# Patient Record
Sex: MALE | Race: WHITE | Employment: UNEMPLOYED | URBAN - METROPOLITAN AREA
[De-identification: names, ages, dates, MRNs, and addresses within clinical notes are randomized per-mention and may not be internally consistent; named-entity substitution may affect disease eponyms.]

---

## 2020-01-01 ENCOUNTER — HOSPITAL ENCOUNTER (EMERGENCY)
Facility: CLINIC | Age: 25
Discharge: HOME | End: 2020-07-29
Attending: EMERGENCY MEDICINE
Payer: MEDICARE

## 2020-01-01 ENCOUNTER — APPOINTMENT (OUTPATIENT)
Dept: GENERAL RADIOLOGY | Age: 25
DRG: 917 | End: 2020-01-01
Payer: MEDICARE

## 2020-01-01 ENCOUNTER — CARE COORDINATION (OUTPATIENT)
Dept: CARE COORDINATION | Age: 25
End: 2020-01-01

## 2020-01-01 ENCOUNTER — HOSPITAL ENCOUNTER (INPATIENT)
Age: 25
LOS: 4 days | Discharge: HOME | DRG: 885 | End: 2020-07-10
Attending: PSYCHIATRY & NEUROLOGY | Admitting: PSYCHIATRY & NEUROLOGY
Payer: MEDICARE

## 2020-01-01 ENCOUNTER — HOSPITAL ENCOUNTER (INPATIENT)
Age: 25
LOS: 1 days | DRG: 917 | End: 2020-09-22
Attending: EMERGENCY MEDICINE | Admitting: INTERNAL MEDICINE
Payer: MEDICARE

## 2020-01-01 VITALS
TEMPERATURE: 101.3 F | RESPIRATION RATE: 34 BRPM | SYSTOLIC BLOOD PRESSURE: 117 MMHG | HEIGHT: 69 IN | DIASTOLIC BLOOD PRESSURE: 40 MMHG | OXYGEN SATURATION: 96 % | BODY MASS INDEX: 46.65 KG/M2 | WEIGHT: 315 LBS | HEART RATE: 52 BPM

## 2020-01-01 VITALS
WEIGHT: 285 LBS | BODY MASS INDEX: 43.19 KG/M2 | HEART RATE: 85 BPM | DIASTOLIC BLOOD PRESSURE: 80 MMHG | RESPIRATION RATE: 14 BRPM | SYSTOLIC BLOOD PRESSURE: 136 MMHG | TEMPERATURE: 97.3 F | OXYGEN SATURATION: 95 % | HEIGHT: 68 IN

## 2020-01-01 VITALS
BODY MASS INDEX: 46.65 KG/M2 | OXYGEN SATURATION: 95 % | RESPIRATION RATE: 17 BRPM | WEIGHT: 315 LBS | SYSTOLIC BLOOD PRESSURE: 137 MMHG | HEART RATE: 87 BPM | DIASTOLIC BLOOD PRESSURE: 81 MMHG | HEIGHT: 69 IN | TEMPERATURE: 98.4 F

## 2020-01-01 LAB
-: ABNORMAL
ABSOLUTE EOS #: 0.36 K/UL (ref 0–0.44)
ABSOLUTE EOS #: 0.4 K/UL (ref 0–0.4)
ABSOLUTE IMMATURE GRANULOCYTE: 0.13 K/UL (ref 0–0.3)
ABSOLUTE IMMATURE GRANULOCYTE: ABNORMAL K/UL (ref 0–0.3)
ABSOLUTE LYMPH #: 2.3 K/UL (ref 1–4.8)
ABSOLUTE LYMPH #: 4.3 K/UL (ref 1.1–3.7)
ABSOLUTE MONO #: 0.7 K/UL (ref 0.1–1.3)
ABSOLUTE MONO #: 1.12 K/UL (ref 0.1–1.2)
ACETAMINOPHEN LEVEL: <5 UG/ML (ref 10–30)
ALBUMIN SERPL-MCNC: 3.4 G/DL (ref 3.5–5.2)
ALBUMIN SERPL-MCNC: 4 G/DL (ref 3.5–5.2)
ALBUMIN/GLOBULIN RATIO: 1.3 (ref 1–2.5)
ALBUMIN/GLOBULIN RATIO: ABNORMAL (ref 1–2.5)
ALLEN TEST: ABNORMAL
ALLEN TEST: POSITIVE
ALP BLD-CCNC: 71 U/L (ref 40–129)
ALP BLD-CCNC: 89 U/L (ref 40–129)
ALT SERPL-CCNC: 18 U/L (ref 5–41)
ALT SERPL-CCNC: 18 U/L (ref 5–41)
AMORPHOUS: ABNORMAL
AMPHETAMINE SCREEN URINE: NEGATIVE
ANION GAP SERPL CALCULATED.3IONS-SCNC: 11 MMOL/L (ref 9–17)
ANION GAP SERPL CALCULATED.3IONS-SCNC: 13 MMOL/L (ref 9–17)
ANION GAP SERPL CALCULATED.3IONS-SCNC: 22 MMOL/L (ref 9–17)
ANION GAP SERPL CALCULATED.3IONS-SCNC: 22 MMOL/L (ref 9–17)
ANION GAP: 12 MMOL/L (ref 7–16)
ANION GAP: 19 MMOL/L (ref 7–16)
AST SERPL-CCNC: 15 U/L
AST SERPL-CCNC: 21 U/L
BACTERIA: ABNORMAL
BARBITURATE SCREEN URINE: NEGATIVE
BASOPHILS # BLD: 1 % (ref 0–2)
BASOPHILS # BLD: 1 % (ref 0–2)
BASOPHILS ABSOLUTE: 0.09 K/UL (ref 0–0.2)
BASOPHILS ABSOLUTE: 0.1 K/UL (ref 0–0.2)
BENZODIAZEPINE SCREEN, URINE: NEGATIVE
BILIRUB SERPL-MCNC: 0.25 MG/DL (ref 0.3–1.2)
BILIRUB SERPL-MCNC: 0.38 MG/DL (ref 0.3–1.2)
BILIRUBIN URINE: NEGATIVE
BUN BLDV-MCNC: 10 MG/DL (ref 6–20)
BUN BLDV-MCNC: 11 MG/DL (ref 6–20)
BUN BLDV-MCNC: 16 MG/DL (ref 6–20)
BUN BLDV-MCNC: 18 MG/DL (ref 6–20)
BUN/CREAT BLD: ABNORMAL (ref 9–20)
BUPRENORPHINE URINE: NORMAL
CALCIUM IONIZED: 1.25 MMOL/L (ref 1.13–1.33)
CALCIUM IONIZED: 1.35 MMOL/L (ref 1.13–1.33)
CALCIUM IONIZED: 1.46 MMOL/L (ref 1.13–1.33)
CALCIUM SERPL-MCNC: 10.9 MG/DL (ref 8.6–10.4)
CALCIUM SERPL-MCNC: 9.1 MG/DL (ref 8.6–10.4)
CALCIUM SERPL-MCNC: 9.5 MG/DL (ref 8.6–10.4)
CALCIUM SERPL-MCNC: 9.6 MG/DL (ref 8.6–10.4)
CANNABINOID SCREEN URINE: NEGATIVE
CASTS UA: ABNORMAL /LPF (ref 0–8)
CHLORIDE BLD-SCNC: 105 MMOL/L (ref 98–107)
CHLORIDE BLD-SCNC: 108 MMOL/L (ref 98–107)
CHLORIDE BLD-SCNC: 99 MMOL/L (ref 98–107)
CHLORIDE BLD-SCNC: 99 MMOL/L (ref 98–107)
CHOLESTEROL/HDL RATIO: 5.1
CHOLESTEROL: 168 MG/DL
CO2: 14 MMOL/L (ref 20–31)
CO2: 15 MMOL/L (ref 20–31)
CO2: 20 MMOL/L (ref 20–31)
CO2: 23 MMOL/L (ref 20–31)
COCAINE METABOLITE, URINE: NEGATIVE
COLOR: YELLOW
CREAT SERPL-MCNC: 0.84 MG/DL (ref 0.7–1.2)
CREAT SERPL-MCNC: 0.86 MG/DL (ref 0.7–1.2)
CREAT SERPL-MCNC: 2.08 MG/DL (ref 0.7–1.2)
CREAT SERPL-MCNC: 2.67 MG/DL (ref 0.7–1.2)
CRYSTALS, UA: ABNORMAL /HPF
DIFFERENTIAL TYPE: ABNORMAL
DIFFERENTIAL TYPE: ABNORMAL
EKG ATRIAL RATE: 37 BPM
EKG ATRIAL RATE: 39 BPM
EKG Q-T INTERVAL: 498 MS
EKG Q-T INTERVAL: 532 MS
EKG QRS DURATION: 162 MS
EKG QRS DURATION: 174 MS
EKG QTC CALCULATION (BAZETT): 476 MS
EKG QTC CALCULATION (BAZETT): 485 MS
EKG R AXIS: -22 DEGREES
EKG R AXIS: -36 DEGREES
EKG T AXIS: 109 DEGREES
EKG T AXIS: 127 DEGREES
EKG VENTRICULAR RATE: 50 BPM
EKG VENTRICULAR RATE: 55 BPM
EOSINOPHILS RELATIVE PERCENT: 2 % (ref 1–4)
EOSINOPHILS RELATIVE PERCENT: 4 % (ref 0–4)
EPITHELIAL CELLS UA: ABNORMAL /HPF (ref 0–5)
ESTIMATED AVERAGE GLUCOSE: 114 MG/DL
ETHANOL PERCENT: <0.01 %
ETHANOL: <10 MG/DL
FIO2: ABNORMAL
FIO2: ABNORMAL
GFR AFRICAN AMERICAN: 36 ML/MIN
GFR AFRICAN AMERICAN: 47 ML/MIN
GFR AFRICAN AMERICAN: >60 ML/MIN
GFR AFRICAN AMERICAN: >60 ML/MIN
GFR NON-AFRICAN AMERICAN: 29 ML/MIN
GFR NON-AFRICAN AMERICAN: 39 ML/MIN
GFR NON-AFRICAN AMERICAN: 48 ML/MIN
GFR NON-AFRICAN AMERICAN: >60 ML/MIN
GFR SERPL CREATININE-BSD FRML MDRD: 58 ML/MIN
GFR SERPL CREATININE-BSD FRML MDRD: >60 ML/MIN
GFR SERPL CREATININE-BSD FRML MDRD: ABNORMAL ML/MIN/{1.73_M2}
GFR SERPL CREATININE-BSD FRML MDRD: NORMAL ML/MIN/{1.73_M2}
GLUCOSE BLD-MCNC: 107 MG/DL (ref 70–99)
GLUCOSE BLD-MCNC: 127 MG/DL (ref 70–99)
GLUCOSE BLD-MCNC: 131 MG/DL (ref 74–100)
GLUCOSE BLD-MCNC: 273 MG/DL (ref 75–110)
GLUCOSE BLD-MCNC: 317 MG/DL (ref 75–110)
GLUCOSE BLD-MCNC: 363 MG/DL (ref 75–110)
GLUCOSE BLD-MCNC: 386 MG/DL (ref 75–110)
GLUCOSE BLD-MCNC: 485 MG/DL (ref 74–100)
GLUCOSE BLD-MCNC: 528 MG/DL (ref 75–110)
GLUCOSE BLD-MCNC: 531 MG/DL (ref 74–100)
GLUCOSE BLD-MCNC: 582 MG/DL (ref 75–110)
GLUCOSE BLD-MCNC: 599 MG/DL (ref 75–110)
GLUCOSE BLD-MCNC: 617 MG/DL (ref 70–99)
GLUCOSE BLD-MCNC: 661 MG/DL (ref 70–99)
GLUCOSE BLD-MCNC: 680 MG/DL (ref 70–99)
GLUCOSE BLD-MCNC: 687 MG/DL (ref 70–99)
GLUCOSE URINE: NEGATIVE
HBA1C MFR BLD: 5.6 % (ref 4–6)
HCO3 VENOUS: 23.3 MMOL/L (ref 22–29)
HCT VFR BLD CALC: 39.9 % (ref 40.7–50.3)
HCT VFR BLD CALC: 45.6 % (ref 41–53)
HDLC SERPL-MCNC: 33 MG/DL
HEMOGLOBIN: 13.2 G/DL (ref 13–17)
HEMOGLOBIN: 15.3 G/DL (ref 13.5–17.5)
IMMATURE GRANULOCYTES: 1 %
IMMATURE GRANULOCYTES: ABNORMAL %
KETONES, URINE: NEGATIVE
LACTIC ACID, WHOLE BLOOD: 10.5 MMOL/L (ref 0.7–2.1)
LACTIC ACID, WHOLE BLOOD: 11.3 MMOL/L (ref 0.7–2.1)
LACTIC ACID, WHOLE BLOOD: 8.5 MMOL/L (ref 0.7–2.1)
LDL CHOLESTEROL: 98 MG/DL (ref 0–130)
LEUKOCYTE ESTERASE, URINE: NEGATIVE
LV EF: 63 %
LVEF MODALITY: NORMAL
LYMPHOCYTES # BLD: 25 % (ref 24–43)
LYMPHOCYTES # BLD: 25 % (ref 24–44)
MAGNESIUM: 1.8 MG/DL (ref 1.6–2.6)
MAGNESIUM: 2 MG/DL (ref 1.6–2.6)
MAGNESIUM: 2.5 MG/DL (ref 1.6–2.6)
MCH RBC QN AUTO: 28.2 PG (ref 26–34)
MCH RBC QN AUTO: 28.4 PG (ref 25.2–33.5)
MCHC RBC AUTO-ENTMCNC: 33.1 G/DL (ref 28.4–34.8)
MCHC RBC AUTO-ENTMCNC: 33.6 G/DL (ref 31–37)
MCV RBC AUTO: 83.9 FL (ref 80–100)
MCV RBC AUTO: 85.8 FL (ref 82.6–102.9)
MDMA URINE: NORMAL
METHADONE SCREEN, URINE: NEGATIVE
METHAMPHETAMINE, URINE: NORMAL
MODE: ABNORMAL
MODE: ABNORMAL
MONOCYTES # BLD: 7 % (ref 3–12)
MONOCYTES # BLD: 8 % (ref 1–7)
MUCUS: ABNORMAL
MYOGLOBIN: 42 NG/ML (ref 28–72)
NEGATIVE BASE EXCESS, ART: 9 (ref 0–2)
NEGATIVE BASE EXCESS, VEN: 2 (ref 0–2)
NITRITE, URINE: NEGATIVE
NRBC AUTOMATED: 0 PER 100 WBC
NRBC AUTOMATED: ABNORMAL PER 100 WBC
O2 DEVICE/FLOW/%: ABNORMAL
O2 DEVICE/FLOW/%: ABNORMAL
O2 SAT, VEN: 86 % (ref 60–85)
OPIATES, URINE: NEGATIVE
OTHER OBSERVATIONS UA: ABNORMAL
OXYCODONE SCREEN URINE: NEGATIVE
PATIENT TEMP: ABNORMAL
PATIENT TEMP: ABNORMAL
PCO2, VEN: 43.3 MM HG (ref 41–51)
PDW BLD-RTO: 14.3 % (ref 11.8–14.4)
PDW BLD-RTO: 14.5 % (ref 11.5–14.9)
PH UA: 6.5 (ref 5–8)
PH VENOUS: 7.34 (ref 7.32–7.43)
PHENCYCLIDINE, URINE: NEGATIVE
PHOSPHORUS: 1.9 MG/DL (ref 2.5–4.5)
PLATELET # BLD: 259 K/UL (ref 150–450)
PLATELET # BLD: 343 K/UL (ref 138–453)
PLATELET ESTIMATE: ABNORMAL
PLATELET ESTIMATE: ABNORMAL
PMV BLD AUTO: 10.8 FL (ref 8.1–13.5)
PMV BLD AUTO: 8.6 FL (ref 6–12)
PO2, VEN: 55.5 MM HG (ref 30–50)
POC CHLORIDE: 105 MMOL/L (ref 98–107)
POC CHLORIDE: 105 MMOL/L (ref 98–107)
POC CREATININE: 1.06 MG/DL (ref 0.51–1.19)
POC CREATININE: 1.75 MG/DL (ref 0.51–1.19)
POC HCO3: 16.5 MMOL/L (ref 21–28)
POC HEMATOCRIT: 37 % (ref 41–53)
POC HEMATOCRIT: 39 % (ref 41–53)
POC HEMOGLOBIN: 12.6 G/DL (ref 13.5–17.5)
POC HEMOGLOBIN: 13.1 G/DL (ref 13.5–17.5)
POC IONIZED CALCIUM: 1.31 MMOL/L (ref 1.15–1.33)
POC IONIZED CALCIUM: 1.4 MMOL/L (ref 1.15–1.33)
POC LACTIC ACID: 4.29 MMOL/L (ref 0.56–1.39)
POC LACTIC ACID: 8.6 MMOL/L (ref 0.56–1.39)
POC LACTIC ACID: 8.75 MMOL/L (ref 0.56–1.39)
POC O2 SATURATION: 94 % (ref 94–98)
POC PCO2 TEMP: ABNORMAL MM HG
POC PCO2 TEMP: ABNORMAL MM HG
POC PCO2: 34.2 MM HG (ref 35–48)
POC PH TEMP: ABNORMAL
POC PH TEMP: ABNORMAL
POC PH: 7.29 (ref 7.35–7.45)
POC PO2 TEMP: ABNORMAL MM HG
POC PO2 TEMP: ABNORMAL MM HG
POC PO2: 75.6 MM HG (ref 83–108)
POC POTASSIUM: 1.7 MMOL/L (ref 3.5–4.5)
POC POTASSIUM: 2.4 MMOL/L (ref 3.5–4.5)
POC POTASSIUM: 4.1 MMOL/L (ref 3.5–4.5)
POC SODIUM: 140 MMOL/L (ref 138–146)
POC SODIUM: 140 MMOL/L (ref 138–146)
POSITIVE BASE EXCESS, ART: ABNORMAL (ref 0–3)
POSITIVE BASE EXCESS, VEN: ABNORMAL (ref 0–3)
POTASSIUM SERPL-SCNC: 1.9 MMOL/L (ref 3.7–5.3)
POTASSIUM SERPL-SCNC: 2.6 MMOL/L (ref 3.7–5.3)
POTASSIUM SERPL-SCNC: 2.9 MMOL/L (ref 3.7–5.3)
POTASSIUM SERPL-SCNC: 3.1 MMOL/L (ref 3.7–5.3)
POTASSIUM SERPL-SCNC: 3.4 MMOL/L (ref 3.7–5.3)
POTASSIUM SERPL-SCNC: 3.8 MMOL/L (ref 3.7–5.3)
POTASSIUM SERPL-SCNC: 4 MMOL/L (ref 3.7–5.3)
PROPOXYPHENE, URINE: NORMAL
PROTEIN UA: ABNORMAL
RBC # BLD: 4.65 M/UL (ref 4.21–5.77)
RBC # BLD: 5.44 M/UL (ref 4.5–5.9)
RBC # BLD: ABNORMAL 10*6/UL
RBC # BLD: ABNORMAL 10*6/UL
RBC UA: ABNORMAL /HPF (ref 0–4)
RENAL EPITHELIAL, UA: ABNORMAL /HPF
SALICYLATE LEVEL: <1 MG/DL (ref 3–10)
SAMPLE SITE: ABNORMAL
SAMPLE SITE: ABNORMAL
SARS-COV-2, PCR: NORMAL
SARS-COV-2, RAPID: NORMAL
SARS-COV-2, RAPID: NOT DETECTED
SARS-COV-2: NORMAL
SARS-COV-2: NORMAL
SARS-COV-2: NOT DETECTED
SEG NEUTROPHILS: 62 % (ref 36–66)
SEG NEUTROPHILS: 64 % (ref 36–65)
SEGMENTED NEUTROPHILS ABSOLUTE COUNT: 11.14 K/UL (ref 1.5–8.1)
SEGMENTED NEUTROPHILS ABSOLUTE COUNT: 5.8 K/UL (ref 1.3–9.1)
SODIUM BLD-SCNC: 135 MMOL/L (ref 135–144)
SODIUM BLD-SCNC: 136 MMOL/L (ref 135–144)
SODIUM BLD-SCNC: 139 MMOL/L (ref 135–144)
SODIUM BLD-SCNC: 141 MMOL/L (ref 135–144)
SOURCE: NORMAL
SOURCE: NORMAL
SPECIFIC GRAVITY UA: 1.01 (ref 1–1.03)
TCO2 (CALC), ART: 18 MMOL/L (ref 22–29)
TEST INFORMATION: NORMAL
THYROXINE, FREE: 1 NG/DL (ref 0.93–1.7)
TOTAL CK: 100 U/L (ref 39–308)
TOTAL CO2, VENOUS: 25 MMOL/L (ref 23–30)
TOTAL PROTEIN: 6.1 G/DL (ref 6.4–8.3)
TOTAL PROTEIN: 7.4 G/DL (ref 6.4–8.3)
TOXIC TRICYCLIC SC,BLOOD: NEGATIVE
TRICHOMONAS: ABNORMAL
TRICYCLIC ANTIDEPRESSANTS, UR: NORMAL
TRIGL SERPL-MCNC: 186 MG/DL
TSH SERPL DL<=0.05 MIU/L-ACNC: 2.14 MIU/L (ref 0.3–5)
TURBIDITY: CLEAR
URINE HGB: NEGATIVE
UROBILINOGEN, URINE: NORMAL
VLDLC SERPL CALC-MCNC: ABNORMAL MG/DL (ref 1–30)
WBC # BLD: 17.1 K/UL (ref 3.5–11.3)
WBC # BLD: 9.2 K/UL (ref 3.5–11)
WBC # BLD: ABNORMAL 10*3/UL
WBC # BLD: ABNORMAL 10*3/UL
WBC UA: ABNORMAL /HPF (ref 0–5)
YEAST: ABNORMAL

## 2020-01-01 PROCEDURE — 84443 ASSAY THYROID STIM HORMONE: CPT

## 2020-01-01 PROCEDURE — 80053 COMPREHEN METABOLIC PANEL: CPT

## 2020-01-01 PROCEDURE — 1240000000 HC EMOTIONAL WELLNESS R&B

## 2020-01-01 PROCEDURE — 31500 INSERT EMERGENCY AIRWAY: CPT

## 2020-01-01 PROCEDURE — 6360000002 HC RX W HCPCS

## 2020-01-01 PROCEDURE — 85025 COMPLETE CBC W/AUTO DIFF WBC: CPT

## 2020-01-01 PROCEDURE — 84100 ASSAY OF PHOSPHORUS: CPT

## 2020-01-01 PROCEDURE — 93005 ELECTROCARDIOGRAM TRACING: CPT | Performed by: EMERGENCY MEDICINE

## 2020-01-01 PROCEDURE — 82550 ASSAY OF CK (CPK): CPT

## 2020-01-01 PROCEDURE — 82435 ASSAY OF BLOOD CHLORIDE: CPT

## 2020-01-01 PROCEDURE — 80048 BASIC METABOLIC PNL TOTAL CA: CPT

## 2020-01-01 PROCEDURE — 2580000003 HC RX 258

## 2020-01-01 PROCEDURE — 2500000003 HC RX 250 WO HCPCS: Performed by: STUDENT IN AN ORGANIZED HEALTH CARE EDUCATION/TRAINING PROGRAM

## 2020-01-01 PROCEDURE — 81001 URINALYSIS AUTO W/SCOPE: CPT

## 2020-01-01 PROCEDURE — 6360000002 HC RX W HCPCS: Performed by: STUDENT IN AN ORGANIZED HEALTH CARE EDUCATION/TRAINING PROGRAM

## 2020-01-01 PROCEDURE — 2580000003 HC RX 258: Performed by: STUDENT IN AN ORGANIZED HEALTH CARE EDUCATION/TRAINING PROGRAM

## 2020-01-01 PROCEDURE — 6370000000 HC RX 637 (ALT 250 FOR IP): Performed by: NURSE PRACTITIONER

## 2020-01-01 PROCEDURE — 82947 ASSAY GLUCOSE BLOOD QUANT: CPT

## 2020-01-01 PROCEDURE — 5A1935Z RESPIRATORY VENTILATION, LESS THAN 24 CONSECUTIVE HOURS: ICD-10-PCS | Performed by: INTERNAL MEDICINE

## 2020-01-01 PROCEDURE — 99292 CRITICAL CARE ADDL 30 MIN: CPT

## 2020-01-01 PROCEDURE — 94770 HC ETCO2 MONITOR DAILY: CPT

## 2020-01-01 PROCEDURE — G0480 DRUG TEST DEF 1-7 CLASSES: HCPCS

## 2020-01-01 PROCEDURE — 71045 X-RAY EXAM CHEST 1 VIEW: CPT

## 2020-01-01 PROCEDURE — 80307 DRUG TEST PRSMV CHEM ANLYZR: CPT

## 2020-01-01 PROCEDURE — 6370000000 HC RX 637 (ALT 250 FOR IP): Performed by: STUDENT IN AN ORGANIZED HEALTH CARE EDUCATION/TRAINING PROGRAM

## 2020-01-01 PROCEDURE — 80061 LIPID PANEL: CPT

## 2020-01-01 PROCEDURE — 99283 EMERGENCY DEPT VISIT LOW MDM: CPT

## 2020-01-01 PROCEDURE — 90792 PSYCH DIAG EVAL W/MED SRVCS: CPT | Performed by: NURSE PRACTITIONER

## 2020-01-01 PROCEDURE — 93306 TTE W/DOPPLER COMPLETE: CPT

## 2020-01-01 PROCEDURE — 82565 ASSAY OF CREATININE: CPT

## 2020-01-01 PROCEDURE — 0BH18EZ INSERTION OF ENDOTRACHEAL AIRWAY INTO TRACHEA, VIA NATURAL OR ARTIFICIAL OPENING ENDOSCOPIC: ICD-10-PCS | Performed by: EMERGENCY MEDICINE

## 2020-01-01 PROCEDURE — U0003 INFECTIOUS AGENT DETECTION BY NUCLEIC ACID (DNA OR RNA); SEVERE ACUTE RESPIRATORY SYNDROME CORONAVIRUS 2 (SARS-COV-2) (CORONAVIRUS DISEASE [COVID-19]), AMPLIFIED PROBE TECHNIQUE, MAKING USE OF HIGH THROUGHPUT TECHNOLOGIES AS DESCRIBED BY CMS-2020-01-R: HCPCS

## 2020-01-01 PROCEDURE — 84295 ASSAY OF SERUM SODIUM: CPT

## 2020-01-01 PROCEDURE — 83874 ASSAY OF MYOGLOBIN: CPT

## 2020-01-01 PROCEDURE — 94002 VENT MGMT INPAT INIT DAY: CPT

## 2020-01-01 PROCEDURE — 2700000000 HC OXYGEN THERAPY PER DAY

## 2020-01-01 PROCEDURE — 82330 ASSAY OF CALCIUM: CPT

## 2020-01-01 PROCEDURE — 82803 BLOOD GASES ANY COMBINATION: CPT

## 2020-01-01 PROCEDURE — 93005 ELECTROCARDIOGRAM TRACING: CPT | Performed by: STUDENT IN AN ORGANIZED HEALTH CARE EDUCATION/TRAINING PROGRAM

## 2020-01-01 PROCEDURE — 99291 CRITICAL CARE FIRST HOUR: CPT

## 2020-01-01 PROCEDURE — 94761 N-INVAS EAR/PLS OXIMETRY MLT: CPT

## 2020-01-01 PROCEDURE — 96368 THER/DIAG CONCURRENT INF: CPT

## 2020-01-01 PROCEDURE — 2000000000 HC ICU R&B

## 2020-01-01 PROCEDURE — 36415 COLL VENOUS BLD VENIPUNCTURE: CPT

## 2020-01-01 PROCEDURE — 99291 CRITICAL CARE FIRST HOUR: CPT | Performed by: INTERNAL MEDICINE

## 2020-01-01 PROCEDURE — 6360000002 HC RX W HCPCS: Performed by: INTERNAL MEDICINE

## 2020-01-01 PROCEDURE — 36600 WITHDRAWAL OF ARTERIAL BLOOD: CPT

## 2020-01-01 PROCEDURE — 84439 ASSAY OF FREE THYROXINE: CPT

## 2020-01-01 PROCEDURE — 96365 THER/PROPH/DIAG IV INF INIT: CPT

## 2020-01-01 PROCEDURE — U0002 COVID-19 LAB TEST NON-CDC: HCPCS

## 2020-01-01 PROCEDURE — 84132 ASSAY OF SERUM POTASSIUM: CPT

## 2020-01-01 PROCEDURE — 83605 ASSAY OF LACTIC ACID: CPT

## 2020-01-01 PROCEDURE — 85014 HEMATOCRIT: CPT

## 2020-01-01 PROCEDURE — 83735 ASSAY OF MAGNESIUM: CPT

## 2020-01-01 PROCEDURE — 99232 SBSQ HOSP IP/OBS MODERATE 35: CPT | Performed by: NURSE PRACTITIONER

## 2020-01-01 PROCEDURE — 99232 SBSQ HOSP IP/OBS MODERATE 35: CPT | Performed by: PSYCHIATRY & NEUROLOGY

## 2020-01-01 PROCEDURE — 02HV33Z INSERTION OF INFUSION DEVICE INTO SUPERIOR VENA CAVA, PERCUTANEOUS APPROACH: ICD-10-PCS | Performed by: EMERGENCY MEDICINE

## 2020-01-01 PROCEDURE — 83036 HEMOGLOBIN GLYCOSYLATED A1C: CPT

## 2020-01-01 PROCEDURE — 36556 INSERT NON-TUNNEL CV CATH: CPT

## 2020-01-01 PROCEDURE — 99238 HOSP IP/OBS DSCHRG MGMT 30/<: CPT | Performed by: NURSE PRACTITIONER

## 2020-01-01 PROCEDURE — 2500000003 HC RX 250 WO HCPCS

## 2020-01-01 RX ORDER — FLUOXETINE HYDROCHLORIDE 40 MG/1
40 CAPSULE ORAL DAILY
COMMUNITY

## 2020-01-01 RX ORDER — ATROPINE SULFATE 0.1 MG/ML
INJECTION INTRAVENOUS
Status: COMPLETED
Start: 2020-01-01 | End: 2020-01-01

## 2020-01-01 RX ORDER — SODIUM CHLORIDE 9 MG/ML
INJECTION, SOLUTION INTRAVENOUS CONTINUOUS
Status: DISCONTINUED | OUTPATIENT
Start: 2020-01-01 | End: 2020-01-01

## 2020-01-01 RX ORDER — SODIUM CHLORIDE 0.9 % (FLUSH) 0.9 %
10 SYRINGE (ML) INJECTION EVERY 12 HOURS SCHEDULED
Status: DISCONTINUED | OUTPATIENT
Start: 2020-01-01 | End: 2020-09-23 | Stop reason: HOSPADM

## 2020-01-01 RX ORDER — POLYETHYLENE GLYCOL 3350 17 G/17G
17 POWDER, FOR SOLUTION ORAL DAILY PRN
Status: DISCONTINUED | OUTPATIENT
Start: 2020-01-01 | End: 2020-09-23 | Stop reason: HOSPADM

## 2020-01-01 RX ORDER — NOREPINEPHRINE BIT/0.9 % NACL 16MG/250ML
10 INFUSION BOTTLE (ML) INTRAVENOUS CONTINUOUS
Status: DISCONTINUED | OUTPATIENT
Start: 2020-01-01 | End: 2020-01-01

## 2020-01-01 RX ORDER — MAGNESIUM SULFATE 1 G/100ML
1 INJECTION INTRAVENOUS
Status: COMPLETED | OUTPATIENT
Start: 2020-01-01 | End: 2020-01-01

## 2020-01-01 RX ORDER — TRAZODONE HYDROCHLORIDE 50 MG/1
50 TABLET ORAL NIGHTLY PRN
Status: DISCONTINUED | OUTPATIENT
Start: 2020-01-01 | End: 2020-01-01 | Stop reason: HOSPADM

## 2020-01-01 RX ORDER — ACETAMINOPHEN 650 MG/1
650 SUPPOSITORY RECTAL EVERY 6 HOURS PRN
Status: DISCONTINUED | OUTPATIENT
Start: 2020-01-01 | End: 2020-09-23 | Stop reason: HOSPADM

## 2020-01-01 RX ORDER — HYDROXYZINE HYDROCHLORIDE 25 MG/1
25 TABLET, FILM COATED ORAL 3 TIMES DAILY PRN
Status: DISCONTINUED | OUTPATIENT
Start: 2020-01-01 | End: 2020-01-01 | Stop reason: HOSPADM

## 2020-01-01 RX ORDER — CLONIDINE HYDROCHLORIDE 0.1 MG/1
0.1 TABLET ORAL NIGHTLY
Status: DISCONTINUED | OUTPATIENT
Start: 2020-01-01 | End: 2020-01-01 | Stop reason: HOSPADM

## 2020-01-01 RX ORDER — ARIPIPRAZOLE 5 MG/1
5 TABLET ORAL DAILY
Status: DISCONTINUED | OUTPATIENT
Start: 2020-01-01 | End: 2020-01-01 | Stop reason: HOSPADM

## 2020-01-01 RX ORDER — ONDANSETRON 2 MG/ML
INJECTION INTRAMUSCULAR; INTRAVENOUS
Status: DISPENSED
Start: 2020-01-01 | End: 2020-01-01

## 2020-01-01 RX ORDER — ZONISAMIDE 100 MG/1
200 CAPSULE ORAL 2 TIMES DAILY
Status: DISCONTINUED | OUTPATIENT
Start: 2020-01-01 | End: 2020-01-01 | Stop reason: HOSPADM

## 2020-01-01 RX ORDER — DOPAMINE HYDROCHLORIDE 160 MG/100ML
2.5 INJECTION, SOLUTION INTRAVENOUS CONTINUOUS
Status: DISCONTINUED | OUTPATIENT
Start: 2020-01-01 | End: 2020-09-23 | Stop reason: HOSPADM

## 2020-01-01 RX ORDER — NICOTINE 21 MG/24HR
1 PATCH, TRANSDERMAL 24 HOURS TRANSDERMAL DAILY
Status: DISCONTINUED | OUTPATIENT
Start: 2020-01-01 | End: 2020-01-01

## 2020-01-01 RX ORDER — DEXTROSE MONOHYDRATE 50 MG/ML
100 INJECTION, SOLUTION INTRAVENOUS PRN
Status: DISCONTINUED | OUTPATIENT
Start: 2020-01-01 | End: 2020-09-23 | Stop reason: HOSPADM

## 2020-01-01 RX ORDER — CALCIUM GLUCONATE 94 MG/ML
1 INJECTION, SOLUTION INTRAVENOUS ONCE
Status: DISCONTINUED | OUTPATIENT
Start: 2020-01-01 | End: 2020-09-23 | Stop reason: HOSPADM

## 2020-01-01 RX ORDER — ZONISAMIDE 100 MG/1
200 CAPSULE ORAL 2 TIMES DAILY
Status: DISCONTINUED | OUTPATIENT
Start: 2020-01-01 | End: 2020-09-23 | Stop reason: HOSPADM

## 2020-01-01 RX ORDER — CALCIUM CHLORIDE 100 MG/ML
INJECTION INTRAVENOUS; INTRAVENTRICULAR
Status: DISPENSED
Start: 2020-01-01 | End: 2020-01-01

## 2020-01-01 RX ORDER — ARIPIPRAZOLE 10 MG/1
10 TABLET ORAL DAILY
Status: DISCONTINUED | OUTPATIENT
Start: 2020-01-01 | End: 2020-09-23 | Stop reason: HOSPADM

## 2020-01-01 RX ORDER — ATROPINE SULFATE 0.1 MG/ML
0.6 INJECTION INTRAVENOUS ONCE
Status: COMPLETED | OUTPATIENT
Start: 2020-01-01 | End: 2020-01-01

## 2020-01-01 RX ORDER — 0.9 % SODIUM CHLORIDE 0.9 %
2000 INTRAVENOUS SOLUTION INTRAVENOUS ONCE
Status: COMPLETED | OUTPATIENT
Start: 2020-01-01 | End: 2020-01-01

## 2020-01-01 RX ORDER — ATROPINE SULFATE 0.1 MG/ML
INJECTION INTRAVENOUS
Status: DISPENSED
Start: 2020-01-01 | End: 2020-01-01

## 2020-01-01 RX ORDER — ACETAMINOPHEN 325 MG/1
650 TABLET ORAL EVERY 4 HOURS PRN
Status: DISCONTINUED | OUTPATIENT
Start: 2020-01-01 | End: 2020-01-01 | Stop reason: HOSPADM

## 2020-01-01 RX ORDER — POTASSIUM CHLORIDE 7.45 MG/ML
10 INJECTION INTRAVENOUS PRN
Status: DISCONTINUED | OUTPATIENT
Start: 2020-01-01 | End: 2020-09-23 | Stop reason: HOSPADM

## 2020-01-01 RX ORDER — CLONIDINE HYDROCHLORIDE 0.1 MG/1
0.1 TABLET ORAL NIGHTLY
Qty: 14 TABLET | Refills: 0 | Status: SHIPPED | OUTPATIENT
Start: 2020-01-01

## 2020-01-01 RX ORDER — DEXTROSE MONOHYDRATE 25 G/50ML
12.5 INJECTION, SOLUTION INTRAVENOUS PRN
Status: DISCONTINUED | OUTPATIENT
Start: 2020-01-01 | End: 2020-09-23 | Stop reason: HOSPADM

## 2020-01-01 RX ORDER — HYDROXYZINE HYDROCHLORIDE 25 MG/1
25 TABLET, FILM COATED ORAL 3 TIMES DAILY PRN
COMMUNITY

## 2020-01-01 RX ORDER — ARIPIPRAZOLE 5 MG/1
5 TABLET ORAL DAILY
COMMUNITY
End: 2020-01-01

## 2020-01-01 RX ORDER — NICOTINE POLACRILEX 4 MG
15 LOZENGE BUCCAL PRN
Status: DISCONTINUED | OUTPATIENT
Start: 2020-01-01 | End: 2020-09-23 | Stop reason: HOSPADM

## 2020-01-01 RX ORDER — TRAZODONE HYDROCHLORIDE 50 MG/1
50 TABLET ORAL NIGHTLY PRN
Qty: 14 TABLET | Refills: 0 | Status: SHIPPED | OUTPATIENT
Start: 2020-01-01

## 2020-01-01 RX ORDER — ACETAMINOPHEN 325 MG/1
650 TABLET ORAL EVERY 6 HOURS PRN
Status: DISCONTINUED | OUTPATIENT
Start: 2020-01-01 | End: 2020-09-23 | Stop reason: HOSPADM

## 2020-01-01 RX ORDER — CITALOPRAM 20 MG/1
20 TABLET ORAL DAILY
Status: ON HOLD | COMMUNITY
End: 2020-01-01 | Stop reason: HOSPADM

## 2020-01-01 RX ORDER — POTASSIUM CHLORIDE 29.8 MG/ML
20 INJECTION INTRAVENOUS ONCE
Status: DISCONTINUED | OUTPATIENT
Start: 2020-01-01 | End: 2020-09-23 | Stop reason: HOSPADM

## 2020-01-01 RX ORDER — ONDANSETRON 2 MG/ML
4 INJECTION INTRAMUSCULAR; INTRAVENOUS EVERY 6 HOURS PRN
Status: DISCONTINUED | OUTPATIENT
Start: 2020-01-01 | End: 2020-09-23 | Stop reason: HOSPADM

## 2020-01-01 RX ORDER — ATROPINE SULFATE 0.1 MG/ML
0.6 INJECTION INTRAVENOUS ONCE
Status: DISCONTINUED | OUTPATIENT
Start: 2020-01-01 | End: 2020-09-23 | Stop reason: HOSPADM

## 2020-01-01 RX ORDER — DEXTROSE MONOHYDRATE 100 MG/ML
INJECTION, SOLUTION INTRAVENOUS
Status: COMPLETED
Start: 2020-01-01 | End: 2020-01-01

## 2020-01-01 RX ORDER — NOREPINEPHRINE BIT/0.9 % NACL 16MG/250ML
2 INFUSION BOTTLE (ML) INTRAVENOUS CONTINUOUS
Status: DISCONTINUED | OUTPATIENT
Start: 2020-01-01 | End: 2020-09-23 | Stop reason: HOSPADM

## 2020-01-01 RX ORDER — SENNOSIDES 8.8 MG/5ML
5 LIQUID ORAL 2 TIMES DAILY PRN
Status: DISCONTINUED | OUTPATIENT
Start: 2020-01-01 | End: 2020-09-23 | Stop reason: HOSPADM

## 2020-01-01 RX ORDER — POTASSIUM CHLORIDE 29.8 MG/ML
20 INJECTION INTRAVENOUS PRN
Status: DISCONTINUED | OUTPATIENT
Start: 2020-01-01 | End: 2020-09-23 | Stop reason: HOSPADM

## 2020-01-01 RX ORDER — ARIPIPRAZOLE 10 MG/1
10 TABLET ORAL DAILY
COMMUNITY
Start: 2020-01-01 | End: 2020-01-01

## 2020-01-01 RX ORDER — SODIUM CHLORIDE 0.9 % (FLUSH) 0.9 %
10 SYRINGE (ML) INJECTION PRN
Status: DISCONTINUED | OUTPATIENT
Start: 2020-01-01 | End: 2020-09-23 | Stop reason: HOSPADM

## 2020-01-01 RX ORDER — DEXTROSE, SODIUM CHLORIDE, AND POTASSIUM CHLORIDE 5; .45; .15 G/100ML; G/100ML; G/100ML
INJECTION INTRAVENOUS CONTINUOUS PRN
Status: DISCONTINUED | OUTPATIENT
Start: 2020-01-01 | End: 2020-09-23 | Stop reason: HOSPADM

## 2020-01-01 RX ORDER — BENZTROPINE MESYLATE 1 MG/ML
2 INJECTION INTRAMUSCULAR; INTRAVENOUS 2 TIMES DAILY PRN
Status: DISCONTINUED | OUTPATIENT
Start: 2020-01-01 | End: 2020-01-01 | Stop reason: HOSPADM

## 2020-01-01 RX ORDER — MIDAZOLAM HYDROCHLORIDE 1 MG/ML
4 INJECTION INTRAMUSCULAR; INTRAVENOUS ONCE
Status: COMPLETED | OUTPATIENT
Start: 2020-01-01 | End: 2020-01-01

## 2020-01-01 RX ORDER — FLUOXETINE HYDROCHLORIDE 20 MG/1
40 CAPSULE ORAL DAILY
Status: DISCONTINUED | OUTPATIENT
Start: 2020-01-01 | End: 2020-09-23 | Stop reason: HOSPADM

## 2020-01-01 RX ORDER — MAGNESIUM HYDROXIDE/ALUMINUM HYDROXICE/SIMETHICONE 120; 1200; 1200 MG/30ML; MG/30ML; MG/30ML
30 SUSPENSION ORAL EVERY 6 HOURS PRN
Status: DISCONTINUED | OUTPATIENT
Start: 2020-01-01 | End: 2020-01-01 | Stop reason: HOSPADM

## 2020-01-01 RX ORDER — FLUOXETINE HYDROCHLORIDE 20 MG/1
40 CAPSULE ORAL DAILY
Status: DISCONTINUED | OUTPATIENT
Start: 2020-01-01 | End: 2020-01-01 | Stop reason: HOSPADM

## 2020-01-01 RX ORDER — DOXYCYCLINE 100 MG/1
100 TABLET ORAL 2 TIMES DAILY
Qty: 20 TABLET | Refills: 0 | Status: SHIPPED | OUTPATIENT
Start: 2020-01-01 | End: 2020-01-01

## 2020-01-01 RX ORDER — DEXTROSE MONOHYDRATE 50 MG/ML
INJECTION, SOLUTION INTRAVENOUS
Status: DISPENSED
Start: 2020-01-01 | End: 2020-01-01

## 2020-01-01 RX ORDER — POTASSIUM CHLORIDE 29.8 MG/ML
20 INJECTION INTRAVENOUS ONCE
Status: COMPLETED | OUTPATIENT
Start: 2020-01-01 | End: 2020-01-01

## 2020-01-01 RX ORDER — MIDAZOLAM HYDROCHLORIDE 1 MG/ML
INJECTION INTRAMUSCULAR; INTRAVENOUS
Status: COMPLETED
Start: 2020-01-01 | End: 2020-01-01

## 2020-01-01 RX ORDER — POTASSIUM CHLORIDE 29.8 MG/ML
40 INJECTION INTRAVENOUS ONCE
Status: COMPLETED | OUTPATIENT
Start: 2020-01-01 | End: 2020-01-01

## 2020-01-01 RX ORDER — CLONIDINE HYDROCHLORIDE 0.1 MG/1
0.1 TABLET ORAL NIGHTLY
Status: DISCONTINUED | OUTPATIENT
Start: 2020-01-01 | End: 2020-01-01

## 2020-01-01 RX ADMIN — MAGNESIUM SULFATE HEPTAHYDRATE 1 G: 1 INJECTION, SOLUTION INTRAVENOUS at 07:43

## 2020-01-01 RX ADMIN — DOPAMINE HYDROCHLORIDE IN DEXTROSE 40 MCG/KG/MIN: 1.6 INJECTION, SOLUTION INTRAVENOUS at 20:09

## 2020-01-01 RX ADMIN — DEXTROSE MONOHYDRATE 500 ML: 100 INJECTION, SOLUTION INTRAVENOUS at 08:50

## 2020-01-01 RX ADMIN — EPINEPHRINE 35 MCG/MIN: 1 INJECTION INTRAMUSCULAR; INTRAVENOUS; SUBCUTANEOUS at 04:34

## 2020-01-01 RX ADMIN — ARIPIPRAZOLE 5 MG: 5 TABLET ORAL at 08:28

## 2020-01-01 RX ADMIN — GLUCAGON HYDROCHLORIDE 10 MG: KIT at 05:12

## 2020-01-01 RX ADMIN — SODIUM CHLORIDE 4 UNITS/KG/HR: 9 INJECTION, SOLUTION INTRAVENOUS at 08:04

## 2020-01-01 RX ADMIN — DOPAMINE HYDROCHLORIDE IN DEXTROSE 40 MCG/KG/MIN: 1.6 INJECTION, SOLUTION INTRAVENOUS at 21:17

## 2020-01-01 RX ADMIN — POTASSIUM BICARBONATE 40 MEQ: 782 TABLET, EFFERVESCENT ORAL at 08:25

## 2020-01-01 RX ADMIN — I.V. FAT EMULSION 225 ML: 20 EMULSION INTRAVENOUS at 08:22

## 2020-01-01 RX ADMIN — POTASSIUM BICARBONATE 40 MEQ: 782 TABLET, EFFERVESCENT ORAL at 10:04

## 2020-01-01 RX ADMIN — MIDAZOLAM HYDROCHLORIDE 4 MG: 1 INJECTION INTRAMUSCULAR; INTRAVENOUS at 03:15

## 2020-01-01 RX ADMIN — ARIPIPRAZOLE 5 MG: 5 TABLET ORAL at 13:40

## 2020-01-01 RX ADMIN — ZONISAMIDE 200 MG: 100 CAPSULE ORAL at 21:01

## 2020-01-01 RX ADMIN — EPINEPHRINE 35 MCG/MIN: 1 INJECTION INTRAMUSCULAR; INTRAVENOUS; SUBCUTANEOUS at 10:29

## 2020-01-01 RX ADMIN — MAGNESIUM SULFATE HEPTAHYDRATE 1 G: 1 INJECTION, SOLUTION INTRAVENOUS at 08:56

## 2020-01-01 RX ADMIN — HYDROXYZINE HYDROCHLORIDE 25 MG: 25 TABLET, FILM COATED ORAL at 20:50

## 2020-01-01 RX ADMIN — Medication: at 14:29

## 2020-01-01 RX ADMIN — DOPAMINE HYDROCHLORIDE IN DEXTROSE 5 MCG/KG/MIN: 1.6 INJECTION, SOLUTION INTRAVENOUS at 07:40

## 2020-01-01 RX ADMIN — EPINEPHRINE 35 MCG/MIN: 1 INJECTION INTRAMUSCULAR; INTRAVENOUS; SUBCUTANEOUS at 15:40

## 2020-01-01 RX ADMIN — SODIUM CHLORIDE 2000 ML: 9 INJECTION, SOLUTION INTRAVENOUS at 02:37

## 2020-01-01 RX ADMIN — ARIPIPRAZOLE 5 MG: 5 TABLET ORAL at 08:44

## 2020-01-01 RX ADMIN — TRAZODONE HYDROCHLORIDE 50 MG: 50 TABLET ORAL at 22:16

## 2020-01-01 RX ADMIN — SODIUM CHLORIDE 1 UNITS/KG/HR: 9 INJECTION, SOLUTION INTRAVENOUS at 03:30

## 2020-01-01 RX ADMIN — ZONISAMIDE 200 MG: 100 CAPSULE ORAL at 22:16

## 2020-01-01 RX ADMIN — VASOPRESSIN 0.06 UNITS/MIN: 20 INJECTION INTRAVENOUS at 11:16

## 2020-01-01 RX ADMIN — ZONISAMIDE 200 MG: 100 CAPSULE ORAL at 20:50

## 2020-01-01 RX ADMIN — DEXTROSE MONOHYDRATE: 100 INJECTION, SOLUTION INTRAVENOUS at 03:30

## 2020-01-01 RX ADMIN — PHENYLEPHRINE HYDROCHLORIDE 100 MCG/MIN: 10 INJECTION INTRAVENOUS at 16:43

## 2020-01-01 RX ADMIN — Medication 4 MG/HR: at 03:15

## 2020-01-01 RX ADMIN — I.V. FAT EMULSION 100 ML: 20 EMULSION INTRAVENOUS at 14:29

## 2020-01-01 RX ADMIN — Medication 7 MG/HR: at 19:36

## 2020-01-01 RX ADMIN — HYDROXYZINE HYDROCHLORIDE 25 MG: 25 TABLET, FILM COATED ORAL at 21:09

## 2020-01-01 RX ADMIN — TRAZODONE HYDROCHLORIDE 50 MG: 50 TABLET ORAL at 21:01

## 2020-01-01 RX ADMIN — FLUOXETINE HYDROCHLORIDE 40 MG: 20 CAPSULE ORAL at 08:27

## 2020-01-01 RX ADMIN — FLUOXETINE HYDROCHLORIDE 40 MG: 20 CAPSULE ORAL at 08:29

## 2020-01-01 RX ADMIN — FLUOXETINE HYDROCHLORIDE 40 MG: 20 CAPSULE ORAL at 08:43

## 2020-01-01 RX ADMIN — TRAZODONE HYDROCHLORIDE 50 MG: 50 TABLET ORAL at 21:09

## 2020-01-01 RX ADMIN — EPINEPHRINE 35 MCG/MIN: 1 INJECTION INTRAMUSCULAR; INTRAVENOUS; SUBCUTANEOUS at 21:00

## 2020-01-01 RX ADMIN — ATROPINE SULFATE 0.6 MG: 0.1 INJECTION PARENTERAL at 02:36

## 2020-01-01 RX ADMIN — Medication 50 MCG/MIN: at 16:01

## 2020-01-01 RX ADMIN — ATROPINE SULFATE 0.6 MG: 0.1 INJECTION INTRAVENOUS at 02:36

## 2020-01-01 RX ADMIN — ZONISAMIDE 200 MG: 100 CAPSULE ORAL at 09:34

## 2020-01-01 RX ADMIN — MIDAZOLAM HYDROCHLORIDE 4 MG: 1 INJECTION, SOLUTION INTRAMUSCULAR; INTRAVENOUS at 03:15

## 2020-01-01 RX ADMIN — ACTIVATED CHARCOAL 50 G: 208 SUSPENSION ORAL at 03:48

## 2020-01-01 RX ADMIN — POTASSIUM BICARBONATE 40 MEQ: 782 TABLET, EFFERVESCENT ORAL at 08:36

## 2020-01-01 RX ADMIN — ZONISAMIDE 200 MG: 100 CAPSULE ORAL at 08:42

## 2020-01-01 RX ADMIN — Medication 5 MCG/MIN: at 02:25

## 2020-01-01 RX ADMIN — CLONIDINE HYDROCHLORIDE 0.1 MG: 0.1 TABLET ORAL at 21:01

## 2020-01-01 RX ADMIN — DOPAMINE HYDROCHLORIDE IN DEXTROSE 40 MCG/KG/MIN: 1.6 INJECTION, SOLUTION INTRAVENOUS at 16:24

## 2020-01-01 RX ADMIN — ARIPIPRAZOLE 5 MG: 5 TABLET ORAL at 08:27

## 2020-01-01 RX ADMIN — DOPAMINE HYDROCHLORIDE IN DEXTROSE 40 MCG/KG/MIN: 1.6 INJECTION, SOLUTION INTRAVENOUS at 17:27

## 2020-01-01 RX ADMIN — DOPAMINE HYDROCHLORIDE IN DEXTROSE 40 MCG/KG/MIN: 1.6 INJECTION, SOLUTION INTRAVENOUS at 18:59

## 2020-01-01 RX ADMIN — SODIUM CHLORIDE: 0.9 INJECTION, SOLUTION INTRAVENOUS at 08:16

## 2020-01-01 RX ADMIN — Medication 2 MG/HR: at 12:56

## 2020-01-01 RX ADMIN — VASOPRESSIN 0.04 UNITS/MIN: 20 INJECTION INTRAVENOUS at 05:32

## 2020-01-01 RX ADMIN — SODIUM CHLORIDE 43.89 UNITS/HR: 9 INJECTION, SOLUTION INTRAVENOUS at 19:21

## 2020-01-01 RX ADMIN — CLONIDINE HYDROCHLORIDE 0.1 MG: 0.1 TABLET ORAL at 20:50

## 2020-01-01 RX ADMIN — POTASSIUM BICARBONATE 40 MEQ: 782 TABLET, EFFERVESCENT ORAL at 14:30

## 2020-01-01 RX ADMIN — SODIUM CHLORIDE 16.71 UNITS/HR: 9 INJECTION, SOLUTION INTRAVENOUS at 11:50

## 2020-01-01 RX ADMIN — ZONISAMIDE 200 MG: 100 CAPSULE ORAL at 08:27

## 2020-01-01 RX ADMIN — POTASSIUM CHLORIDE 20 MEQ: 29.8 INJECTION, SOLUTION INTRAVENOUS at 09:01

## 2020-01-01 RX ADMIN — DOPAMINE HYDROCHLORIDE IN DEXTROSE 40 MCG/KG/MIN: 1.6 INJECTION, SOLUTION INTRAVENOUS at 14:04

## 2020-01-01 RX ADMIN — POTASSIUM CHLORIDE 40 MEQ: 400 INJECTION, SOLUTION INTRAVENOUS at 07:00

## 2020-01-01 RX ADMIN — EPINEPHRINE 35 MCG/MIN: 1 INJECTION INTRAMUSCULAR; INTRAVENOUS; SUBCUTANEOUS at 12:44

## 2020-01-01 RX ADMIN — ZONISAMIDE 200 MG: 100 CAPSULE ORAL at 08:28

## 2020-01-01 RX ADMIN — POTASSIUM CHLORIDE 20 MEQ: 29.8 INJECTION, SOLUTION INTRAVENOUS at 15:28

## 2020-01-01 RX ADMIN — FLUOXETINE HYDROCHLORIDE 40 MG: 20 CAPSULE ORAL at 13:40

## 2020-01-01 RX ADMIN — ZONISAMIDE 200 MG: 100 CAPSULE ORAL at 21:09

## 2020-01-01 RX ADMIN — DOPAMINE HYDROCHLORIDE IN DEXTROSE 20 MCG/KG/MIN: 1.6 INJECTION, SOLUTION INTRAVENOUS at 09:59

## 2020-01-01 RX ADMIN — Medication 50 MCG/MIN: at 10:27

## 2020-01-01 RX ADMIN — HYDROXYZINE HYDROCHLORIDE 25 MG: 25 TABLET, FILM COATED ORAL at 21:01

## 2020-01-01 RX ADMIN — DOPAMINE HYDROCHLORIDE IN DEXTROSE 20 MCG/KG/MIN: 1.6 INJECTION, SOLUTION INTRAVENOUS at 12:20

## 2020-01-01 RX ADMIN — CLONIDINE HYDROCHLORIDE 0.1 MG: 0.1 TABLET ORAL at 21:09

## 2020-01-01 RX ADMIN — TRAZODONE HYDROCHLORIDE 50 MG: 50 TABLET ORAL at 20:50

## 2020-01-01 RX ADMIN — DOPAMINE HYDROCHLORIDE IN DEXTROSE 40 MCG/KG/MIN: 1.6 INJECTION, SOLUTION INTRAVENOUS at 15:11

## 2020-01-01 RX ADMIN — VASOPRESSIN 0.06 UNITS/MIN: 20 INJECTION INTRAVENOUS at 15:43

## 2020-01-01 ASSESSMENT — PULMONARY FUNCTION TESTS
PIF_VALUE: 14
PIF_VALUE: 10
PIF_VALUE: 13
PIF_VALUE: 17
PIF_VALUE: 13
PIF_VALUE: 17
PIF_VALUE: 13
PIF_VALUE: 12
PIF_VALUE: 13
PIF_VALUE: 13

## 2020-01-01 ASSESSMENT — LIFESTYLE VARIABLES
HISTORY_ALCOHOL_USE: NO
HISTORY_ALCOHOL_USE: NO

## 2020-01-01 ASSESSMENT — PATIENT HEALTH QUESTIONNAIRE - PHQ9
SUM OF ALL RESPONSES TO PHQ QUESTIONS 1-9: 15
SUM OF ALL RESPONSES TO PHQ QUESTIONS 1-9: 13

## 2020-01-01 ASSESSMENT — PAIN SCALES - GENERAL
PAINLEVEL_OUTOF10: 0

## 2020-01-01 ASSESSMENT — SLEEP AND FATIGUE QUESTIONNAIRES
SLEEP PATTERN: DISTURBED/INTERRUPTED SLEEP
DO YOU USE A SLEEP AID: YES
DIFFICULTY ARISING: NO
DIFFICULTY STAYING ASLEEP: YES
DO YOU USE A SLEEP AID: YES
DIFFICULTY ARISING: NO
RESTFUL SLEEP: NO
AVERAGE NUMBER OF SLEEP HOURS: 5
DIFFICULTY FALLING ASLEEP: YES
DIFFICULTY FALLING ASLEEP: YES
DO YOU HAVE DIFFICULTY SLEEPING: YES
AVERAGE NUMBER OF SLEEP HOURS: 5
DIFFICULTY STAYING ASLEEP: YES
DO YOU HAVE DIFFICULTY SLEEPING: YES
RESTFUL SLEEP: NO

## 2020-07-06 PROBLEM — F25.9 SCHIZOAFFECTIVE DISORDER (HCC): Status: ACTIVE | Noted: 2020-01-01

## 2020-07-07 NOTE — VIRTUAL HEALTH
Psychiatric Admission Note Nurse Practitioner     Willie Jimenez is a 25 y.o. male who was involuntarily admitted from [facility] for suicidal and homicidal ideations. He reported homicidal ideations towards his Mother. He has a history of previous angry outbursts while on the unit. Patient reported that he stopped taking his psychiatric medications. Patient is seen via tele psych with staff present. Patient reported that he got into a fight with his Mom regarding his \"chew\" tobacco use resulting in increased depression. He had thoughts of hanging himself. Patient was discharged from [facility] on 2020. Patient reported a history of depression/sadness related to multiple deaths in his family. He recently lost a cousin whose  is on Thursday. He endorsed anxiety but is not able to provide details. He endorses chronic fleeting suicidal thoughts with various plans and intent. Patient contracts for safety. He currently endorses thoughts to harm his mother and sister; he stated that he knows karate and would physically harm them. He denies hallucinations, paranoia, helene and verbal, physical and emotional trauma. He reported that he stopped taking medication yesterday. Patient has global developmental deficiencies. He reported that he is stable on his medication but that he gets angry resulting in increased depression with suicidal ideations. Chart and medications reviewed. Therapeutic support, empathetic care and psycho education provided greater than 20 minutes. At this time there is no safe alternative other than inpatient care. Past Psychiatric History   Patient Reports noncompliance with outpatient psychiatric care. He is currently linked with [facility] with an appointment \"soon. \" Reported history of psychiatric inpatient hospitalizations including being discharged from [facility] on 2020. Reported history of suicide attempts.       History of Substance Abuse     Patient denied cigarette, marijuana and street drug use. He drinks ETOH occasionally. Family History of psychiatric disorders    Family history: positive for Father - bipolar disorder and schizophrenia. Medical History   Allergies:  Mangifera indica   Past Medical History:   Diagnosis Date    Hypertension     Prader-Willi syndrome     Seizures ([address]     epilepsy      Past Surgical History:   Procedure Laterality Date    ADENOIDECTOMY      FRACTURE SURGERY         Neurologic Exam      Mental Status   Oriented to person, place, and time. Oriented to city, area, street and number. Oriented to country. Registration: recalls 3 of 3 objects. Recall at 5 minutes: recalls 3 of 3 objects. Follows 3 step commands. Attention: normal. Concentration: normal.   Speech: speech is normal   Level of consciousness: alert  Knowledge: good. Able to perform simple calculations. Able to name object. Able to read. Able to repeat. Able to write. Normal comprehension.      Cranial Nerves   Cranial nerves II through XII intact.      Motor Exam   Muscle bulk: normal  Overall muscle tone: normal     Strength   Strength 5/5 throughout.      Sensory Exam   Light touch normal.      Gait, Coordination, and Reflexes      Normal     Coordination   Romberg: negative     Tremor   Resting tremor: absent  Intention tremor: absent  Action tremor: absent     Reflexes   Right brachioradialis: 2+  Left brachioradialis: 2+  Right biceps: 2+  Left biceps: 2+  Right triceps: 2+  Left triceps: 2+  Right patellar: 2+  Left patellar: 2+  Right achilles: 2+  Left achilles: 2+  Right : 2+  Left : 2+    SOCIAL HISTORY: Patient was living with his Mom and Sister but will be living with a cousin after discharge. He is from [facility], his mother is living, father is . He has one sister. He completed high school and supports himself with SSDI.    Social History     Socioeconomic History    Marital status: Single     Spouse name: Not on file    Number of children: Not on file    Years of education: Not on file    Highest education level: Not on file   Occupational History    Not on file   Social Needs    Financial resource strain: Not on file    Food insecurity     Worry: Not on file     Inability: Not on file    Transportation needs     Medical: Not on file     Non-medical: Not on file   Tobacco Use    Smoking status: Never Smoker    Smokeless tobacco: Never Used    Tobacco comment: Patient is a non-smoker   Substance and Sexual Activity    Alcohol use: No    Drug use: No    Sexual activity: Not on file   Lifestyle    Physical activity     Days per week: Not on file     Minutes per session: Not on file    Stress: Not on file   Relationships    Social connections     Talks on phone: Not on file     Gets together: Not on file     Attends Sabianism service: Not on file     Active member of club or organization: Not on file     Attends meetings of clubs or organizations: Not on file     Relationship status: Not on file    Intimate partner violence     Fear of current or ex partner: Not on file     Emotionally abused: Not on file     Physically abused: Not on file     Forced sexual activity: Not on file   Other Topics Concern    Not on file   Social History Narrative    Not on file     Mental Status  Pt. was alert, fully oriented, and cooperative. Appearance and hygiene werefair. Patient is obese. . Mood was depressed. Affect was \"dysthymic and poorly reactive Thought process was demonstrative of poverty of thought and thought blocking. Patient denied any hallucinations or paranoia. Patient endorsed chronic, fleeting suicidal ideations with various plans. He contracts while in the hospital. Patient endorsed homicidal ideations towards his sister and mother stating that he \"knows karate\" and would harm them physically. Patient's gross cognitive functions were impaired. Insight and judgement were poor.  Both recent and remote memory were

## 2020-07-07 NOTE — PLAN OF CARE
[facility]  Initial Interdisciplinary Treatment Plan NO      Original treatment plan Date & Time: 7/7/2020  0816    Admission Type:  Admission Type: Involuntary    Reason for admission:   Reason for Admission: Suicidal ideations, Homicidal ideations to stab himself and mother.      Estimated Length of Stay:  5-7days  Estimated Discharge Date: to be determined by physician    PATIENT STRENGTHS:  Patient Strengths:Strengths: No significant Physical Illness, Connection to output provider  Patient Strengths and Limitations:Limitations: Lacks leisure interests, Hopeless about future  Addictive Behavior: Addictive Behavior  In the past 3 months, have you felt or has someone told you that you have a problem with:  : None  Do you have a history of Chemical Use?: No  Do you have a history of Alcohol Use?: No  Do you have a history of Street Drug Abuse?: No  Histroy of Prescripton Drug Abuse?: No  Medical Problems:  Past Medical History:   Diagnosis Date    Hypertension     Prader-Willi syndrome     Seizures ([facility] [address]     epilepsy     Status EXAM:Status and Exam  Normal: No  Facial Expression: Flat  Affect: Blunt  Level of Consciousness: Alert  Mood:Normal: No  Mood: Depressed, Anxious  Motor Activity:Normal: Yes  Interview Behavior: Cooperative  Preception: [place] to Person, Greg Reji to Time, [place] to Place, [place] to Situation  Attention:Normal: Yes  Thought Content:Normal: Yes  Hallucinations: None  Delusions: No  Memory:Normal: No  Memory: Poor Remote, Poor Recent  Insight and Judgment: No  Insight and Judgment: Poor Judgment, Poor Insight  Present Suicidal Ideation: Yes  Present Homicidal Ideation: No    EDUCATION:   Learner Progress Toward Treatment Goals: reviewed group plans and strategies for care    Method:group therapy, medication compliance, individualized assessments and care planning    Outcome: needs reinforcement    PATIENT GOALS: to be discussed with patient within 72 hours    PLAN/TREATMENT RECOMMENDATIONS:     continue group therapy , medications compliance, goal setting, individualized assessments and care, continue to monitor pt on unit      SHORT-TERM GOALS:   Time frame for Short-Term Goals: 5-7 days    LONG-TERM GOALS:  Time frame for Long-Term Goals: 6 months  Members Present in Team Meeting: See Signature Sheet    BOB Culp

## 2020-07-07 NOTE — PLAN OF CARE
Problem: Anger Management/Homicidal Ideation:  Goal: Ability to verbalize frustrations and anger appropriately will improve  Description: Ability to verbalize frustrations and anger appropriately will improve  7/7/2020 1445 by Theodoro Shone, RN  Outcome: Ongoing   Patient was accepting of shift assessment. Patient ate all of his breakfast and continues to have a great appetite. Patient showered first thing in the morning and has appropriate hygiene. Patient stated that he slept well last night. Patient and I discussed where his anger stems from and discussed appropriate coping mechanisms. Patient agrees to seek out staff if feelings of frustration arise. Problem: Altered Mood, Depressive Behavior:  Goal: Absence of self-harm  Description: Absence of self-harm  7/7/2020 1445 by Theodoro Shone, RN  Outcome: Ongoing   Patient remains free of self harm at time of writing. Problem: Suicide risk  Goal: Provide patient with safe environment  Description: Provide patient with safe environment  7/7/2020 1445 by Theodoro Shone, RN  Outcome: Ongoing   Pt. Remains on q15 min checks and frequent spontaneous checks throughout shift. Pt. Safety maintained.

## 2020-07-07 NOTE — BH NOTE
`[facility]  Admission Note     Admission Type:   Admission Type: Involuntary    Reason for admission:  Reason for Admission: Suicidal ideations, Homicidal ideations to stab himself and mother.      PATIENT STRENGTHS:  Strengths: No significant Physical Illness, Connection to output provider    Patient Strengths and Limitations:  Limitations: Lacks leisure interests, Hopeless about future    Addictive Behavior:   Addictive Behavior  In the past 3 months, have you felt or has someone told you that you have a problem with:  : None  Do you have a history of Chemical Use?: No  Do you have a history of Alcohol Use?: No  Do you have a history of Street Drug Abuse?: No  Histroy of Prescripton Drug Abuse?: No    Medical Problems:   Past Medical History:   Diagnosis Date    Hypertension     Prader-Willi syndrome     Seizures ([facility])     epilepsy       Status EXAM:  Status and Exam  Normal: No  Facial Expression: Flat  Affect: Blunt  Level of Consciousness: Alert  Mood:Normal: No  Mood: Depressed, Anxious  Motor Activity:Normal: Yes  Interview Behavior: Cooperative  Preception: [place] to Person, Peggyann Dynes to Time, [place] to Place, [place] to Situation  Attention:Normal: Yes  Thought Content:Normal: Yes  Hallucinations: None  Delusions: No  Memory:Normal: No  Memory: Poor Remote, Poor Recent  Insight and Judgment: No  Insight and Judgment: Poor Judgment, Poor Insight  Present Suicidal Ideation: Yes  Present Homicidal Ideation: No    Tobacco Screening:  Practical Counseling, on admission, trish X, if applicable and completed (first 3 are required if patient doesn't refuse):            ( )  Recognizing danger situations (included triggers and roadblocks)                    ( )  Coping skills (new ways to manage stress, exercise, relaxation techniques, changing routine, distraction)                                                           ( )  Basic information about quitting (benefits of quitting, techniques in how to quit, available resources  ( ) Referral for counseling faxed to [facility]                                           ( ) Patient refused counseling  (X) Patient has not smoked in the last 30 days    Metabolic Screening:    No results found for: LABA1C    No results found for: CHOL  No results found for: TRIG  No results found for: HDL  No components found for: LDLCAL  No results found for: LABVLDL      Body mass index is 43.33 kg/m². BP Readings from Last 2 Encounters:   07/06/20 124/76           Pt admitted with followings belongings:  Dentures: None  Vision - Corrective Lenses: None  Hearing Aid: None  Jewelry: Ring  Body Piercings Removed: N/A  Clothing: Footwear, Pants, Shirt, Undergarments (Comment)  Were All Patient Medications Collected?: Not Applicable  Other Valuables: Cell phone, Money (Comment), Wallet($0.83)     Valuables sent home with n/a. Valuables placed in safe in security envelope, number:  [id number]. Patient's home medications were verified and discussed with patient. Patient oriented to surroundings and program expectations and copy of patient rights given. Received admission packet:  yes. Consents reviewed, signed yes. Refused no. Patient verbalize understanding:  yes. Patient education on precautions: yes    Patient admitted to room 213 per provider order, upon admission patient was oriented to the unit, offered nourishment and scanned with the metal detector, patient reports increasing life stressors, suicidal ideation persists on admission but patient no longer has a plan and can maintain his own safety, reports taking himself off his psych meds, remains compliant with the blood pressure and seizure medications, cooperative with the admission process, currently in dayroom listening to music.                    Lay Pacheco RN

## 2020-07-07 NOTE — BH NOTE
Transfer Note:   Pt transferred to [facility] unit. Belongings sent with pt. Report called to RN, Tara Louis staff. Pt oriented to unit policies and procedures. Pt denies thoughts of self harm at time of transfer and verbalizes understanding of transfer.

## 2020-07-07 NOTE — CARE COORDINATION
BHI Biopsychosocial Assessment    Current Level of Psychosocial Functioning     Independent   Dependent    Minimal Assist X    Psychosocial High Risk Factors     Unable to obtain meds   Chronic illness/pain  X- Prader-Willi Syndrome genetic disorder \ Epilepsy \ HTN  Substance abuse   Addictive Behaviors  Lack of Family Support X - currently kicked out of mother / sister household on [address]  Financial stress X- SSI mother is payee  Isolation X  Inadequate Community Resources  Suicide attempt(s) X - hx of suicidal ideations and plans but no actual attempts to self harm  Self Mutilation  Safety Plan X- safety plan provided and explained to patient to fill out and return to staff   Not taking medications   Victim of crime   Developmental Delay X - patient reports he has been dx with Developmental delayed but did not get services from [place] dx with Prader-Willi Syndrome  Learning Disabilities X - was on an IEP in school   Unable to manage personal needs     Age 72 or older   Homeless  Legal Issues  No transportation  X - straight medicaid / medicare insurance   Readmission within 30 days X - recently discharged from [place] [address] on 7/2 and was linked with [facility] at discharge  Unemployment  Traumatic Event    Psychiatric Advanced Directives: none    Family to Involve in Treatment: cousin Magy Tapia [phone]    Sexual Orientation:  heterosexual    Patient Strengths: medicaid/medicare, income SSI, access to some support system, compliant with medications    Patient Barriers:  Anger management issues, poor impulse control, poor coping/problem solving skills    Opiate/AOD Education Provided:   Denies any hx or use    CMHC/mental health history: prior inpatient and outpatient hx / recently linked with [facility] on file    Plan of Care   medication management, group/individual therapies, family meetings, psycho -education, treatment team meetings to assist with stabilization    Initial Discharge Plan: Patient to explore going to live with cousin in [address] [facility] ( [facility] in [place] that is closest for [address] and taxi to [employer] home via hospitality cab to [address], [address] [facility] [address]    Clinical Summary:   Denisse Emerson is a single 25year old  male admitted to the [facility] for suicidal /homicidal ideations towards self and his mother and sister. Patient is currently denying any suicidal / homicidal ideations and denies any auditory / visual hallucinations at this time. Patient reports he was recently discharged from [facility] on  and is medication compliant and has a follow up schedule for [facility] on  @ 1 PM with a doctor - clinician called and cancelled appointment this date with patient permission. Patient reports ongoing conflict with sister and mother and this caused him to have a verbal outburst and threaten to harm them and himself. Patient was dx as a child with Prader-Willi Syndrome :  genetic disorder that causes obesity, intellectual disability, and shortness in height. Prader-Willi syndrome is a genetic disorder usually caused by deletion of a part of chromosome 15 passed down by the father. The most common symptoms of Prader-Willi syndrome are behavior problems, intellectual disability, and short stature. Hormonal symptoms include delayed puberty and constant hunger leading to obesity. There is no cure for Prader-Willi but many patients will benefit from a supervised diet. Some symptoms can be treated with hormone therapy. Patient reports father is  and he was living with mother and sister. Patient denies any legal or AoD issues, denies any hx of trauma or abuse. Reports poor sleep and appetite as normal.  Patient reports he will be going to stay with his cousin in [address] [facility].

## 2020-07-07 NOTE — PROGRESS NOTES
Clinician met with patient this date for 1:1 and discussed patient going to cousin's and confirmed address and number and patient to be linked to [facility] in [place]. Patient in agreement with this and looks forward to going to his cousin Frank's.

## 2020-07-07 NOTE — PROGRESS NOTES
Pt met 1 to 1 with RT to discuss coping skills and leisure interests.  Pt accepted leisure /coping skills packet

## 2020-07-08 NOTE — GROUP NOTE
Group Therapy Note    Date: 7/8/2020    Group Start Time: 0910  Group End Time: 0935  Group Topic: Community Meeting    FRANCISCO Alves Epley, [address]    Attendees: 10         Patient's Goal:  To be informed of group programming schedule for today and reiteration of unit guidelines. To verbalize obtainable short term goal for today pertaining to mental health and stability. Notes:  Patient verbalized goal for today to keep his anger under control by ignoring negativity brought on by his sister and mother. Status After Intervention:  Improved    Participation Level:  Active Listener and Interactive    Participation Quality: Appropriate, Attentive and Sharing      Speech:  normal      Thought Process/Content: Logical      Affective Functioning: Congruent      Mood: euthymic      Level of consciousness:  Alert, Oriented x4 and Attentive      Response to Learning: Able to verbalize current knowledge/experience, Able to verbalize/acknowledge new learning, Able to retain information, Capable of insight and Progressing to goal      Endings: None Reported       Modes of Intervention: Education, Support, Socialization, Exploration, Clarifying, Problem-solving, Confrontation, Limit-setting and Reality-testing      Discipline Responsible: Psychoeducational Specialist      Signature:  Kareem Jean

## 2020-07-08 NOTE — PROGRESS NOTES
Pharmacy Med Education Group Note    Date: 07/08/20  Start Time: 9416  End Time: 8046    Number Participants in Group:  9    Goal:  Patient will demonstrate an understanding of the medications intended purpose and possible adverse effects  Topic: [place] for Pharmacy Med Ed Group    Discipline Responsible:     OT  AT  [facility].  RT     X Other       Participation Level:     None  Minimal      X Active Listener    X Interactive    Monopolizing         Participation Quality:    X Appropriate  Inappropriate     X       Attentive        Intrusive          Sharing        Resistant          Supportive        Lethargic       Affective:     X Congruent  Incongruent  Blunted  Flat    Constricted  Anxious  Elated  Angry    Labile  Depressed  Other         Cognitive:    X Alert  Oriented PPTP     Concentration   X G  F  P   Attention Span   X G  F  P   Short-Term Memory   X G  F  P   Long-Term Memory  G  F  P   ProblemSolving/  Decision Making  G  F  P   Ability to Process  Information   X G  F  P      Contributing Factors             Delusional             Hallucinating             Flight of Ideas             Other:       Modes of Intervention:    X Education   X Support  Exploration    Clarifying  Problem Solving  Confrontation    Socialization  Limit Setting  Reality Testing    Activity  Movement  Media    Other:            Response to Learning:    X Able to verbalize current knowledge/experience    Able to verbalize/acknowledge new learning    Able to retain information    Capable of insight    Able to change behavior    Progressing to goal    Other:        Comments:     Feliciano Shields, PharmD, BCPS  7/8/2020 5:00 PM

## 2020-07-08 NOTE — BH NOTE
Department of Psychiatry  Attending Progress Note  Chief Complaint: Schizoaffective disorder ([facility] [address]     SUBJECTIVE:    Patient feels depressed and sad. He is experiencing auditory hallucinations. He is experiencing delusions of persecution. He said that his mother and sister pushing him to hurt and he did not want to deal with it anymore. He wanted to kill himself in front of his sister and mother so that they will see his death. He is upset about everything. He said he completed his high school and went to some college. He has no job or income. He has no insight. His judgment is impaired.   OBJECTIVE    Physical  BP (!) 132/93   Pulse 91   Temp 98.1 °F (36.7 °C) (Oral)   Resp 15   Ht 5' 8\" (1.727 m)   Wt 285 lb (129.3 kg)   SpO2 95%   BMI 43.33 kg/m²      Mental Status Evaluation:  Orientation: alertness: alert   Mood:. anxious, constricted, decreased range, depressed and dysthymic      Affect:  constricted      Appearance:  age appropriate and bearded   Activity:  Psychomotor Agitation   Gait/Posture: Normal   Speech:  delayed and increased latency of response   Thought Process:  circumstantial   Thought Content:  delusions, hallucinations and suicidal   Sensorium:  person and place   Cognition:  grossly intact   Memory: intact   Insight:  limited   Judgment: limited   Suicidal Ideations: passive and without plan   Homicidal Ideations: Negative for homicidal ideation      Medication Side Effects: absent       Attention Span attention span appeared shorter than expected for age     Medications  Current Facility-Administered Medications   Medication Dose Route Frequency Provider Last Rate Last Dose    hydrOXYzine (ATARAX) tablet 25 mg  25 mg Oral TID PRN Merlin Schanz, APRN - CNP   25 mg at 07/07/20 2101    FLUoxetine (PROZAC) capsule 40 mg  40 mg Oral Daily Merlin Schanz, APRN - CNP   40 mg at 07/07/20 1340    ARIPiprazole (ABILIFY) tablet 5 mg  5 mg Oral Daily Merlin Schanz, APRN - CNP   5 mg at 07/07/20 1340    cloNIDine (CATAPRES) tablet 0.1 mg  0.1 mg Oral Nightly Janalyn Clear, APRN - CNP   0.1 mg at 07/07/20 2101    acetaminophen (TYLENOL) tablet 650 mg  650 mg Oral Q4H PRN Kellie Dy, APRN - CNP        traZODone (DESYREL) tablet 50 mg  50 mg Oral Nightly PRN Kellie Dy, APRN - CNP   50 mg at 07/07/20 2101    benztropine mesylate (COGENTIN) injection 2 mg  2 mg Intramuscular BID PRN Kellie Dy, APRN - CNP        magnesium hydroxide (MILK OF MAGNESIA) 400 MG/5ML suspension 30 mL  30 mL Oral Daily PRN Kellie Dy, APRN - CNP        aluminum & magnesium hydroxide-simethicone (MAALOX) 200-200-20 MG/5ML suspension 30 mL  30 mL Oral Q6H PRN Kellie Dy, APRN - CNP        zonisamide (ZONEGRAN) capsule 200 mg  200 mg Oral BID Kellie Dy, APRN - CNP   200 mg at 07/07/20 2101         FLUoxetine  40 mg Oral Daily    ARIPiprazole  5 mg Oral Daily    cloNIDine  0.1 mg Oral Nightly    zonisamide  200 mg Oral BID       ASSESSMENT  Schizoaffective disorder (HCC)     Patient's Response to Treatment: negative    PLAN  Dragon voice recognition software used in portions of this document.

## 2020-07-08 NOTE — GROUP NOTE
Group Therapy Note    Date: 7/8/2020    Group Start Time: 1330  Group End Time: 9924  Group Topic: Recreational    FRANCISCO Roth Jag, [place]    Attendees: 5         Patient's Goal:  To demonstrate increased interpersonal skills. Notes:  Patient attended group and actively participated in task at hand. Patient conversed appropriately with peers and Trena Morin. Status After Intervention:  Improved    Participation Level:  Active Listener and Interactive    Participation Quality: Appropriate, Attentive and Sharing      Speech:  normal      Thought Process/Content: Logical      Affective Functioning: Congruent      Mood: euthymic      Level of consciousness:  Alert, Oriented x4 and Attentive      Response to Learning: Able to verbalize current knowledge/experience, Able to verbalize/acknowledge new learning, Able to retain information, Capable of insight and Progressing to goal      Endings: None Reported    Modes of Intervention: Socialization, Exploration, Clarifying, Problem-solving, Activity, Limit-setting and Reality-testing      Discipline Responsible: Psychoeducational Specialist      Signature:  Nati Enamorado

## 2020-07-08 NOTE — H&P
HISTORY and [id number]       NAME:  Dee Dee Sellers  MRN: [id number]   YOB: 1995   Date: 7/8/2020   Age: 25 y.o. Gender: male     COMPLAINT AND PRESENT HISTORY:      Dee Dee Sellers is 25 y.o.,  male, admitted via [facility] because of Schizoaffective Disorder. Per chart review, Pt having suicidal ideation and homicidal ideation toward mother after having arguments with mother and sister. Pt denies any auditory, visual, or tactile hallucinations. Pt denies any suicidal ideation. Pt denies any homicidal ideation. Pt has a history of previous psychiatric admissions with similar presentation. Pt reports good sleep and appetite. Pt denies any current alcohol or substance abuse. Pt lives with family. PMHx HTN, denies any headache, dizziness, chest pain/pressure, palpitations. History of seizures. Takes zonisamide BID. Reports has not had a seizure since childhood. History of Prader-Willi syndrome with developmental disability. Pt denies any somatic complaints including recent URI, abdominal pain, nausea, vomiting, diarrhea, constipation, dysuria, fever or chills.      DIAGNOSTIC RESULTS   Labs:  CBC:   Recent Labs     07/08/20  0623   WBC 9.2   HGB 15.3        BMP:    Recent Labs     07/08/20  0623      K 4.0      CO2 23   BUN 10   CREATININE 0.84   GLUCOSE 107*     Hepatic:   Recent Labs     07/08/20  0623   AST 15   ALT 18   BILITOT 0.38   ALKPHOS 80     Lipids:   Recent Labs     07/08/20  0623   CHOL 168   HDL 33*       PAST MEDICAL HISTORY     Past Medical History:   Diagnosis Date    Hypertension     Prader-Willi syndrome     Seizures (HCC)     epilepsy     Pt denies any history of Diabetes mellitus type 2, stroke, heart disease, COPD, Asthma, GERD, HLD, Cancer, Thyroid disease, Kidney Disease, Hepatitis, TB.    SURGICAL HISTORY       Past Surgical History:   Procedure Laterality Date    ADENOIDECTOMY      FRACTURE SURGERY FAMILY HISTORY     History reviewed. No pertinent family history. SOCIAL HISTORY       Social History     Socioeconomic History    Marital status: Single     Spouse name: None    Number of children: None    Years of education: None    Highest education level: None   Occupational History    None   Social Needs    Financial resource strain: None    Food insecurity     Worry: None     Inability: None    Transportation needs     Medical: None     Non-medical: None   Tobacco Use    Smoking status: Never Smoker    Smokeless tobacco: Never Used    Tobacco comment: Patient is a non-smoker   Substance and Sexual Activity    Alcohol use: No    Drug use: No    Sexual activity: None   Lifestyle    Physical activity     Days per week: None     Minutes per session: None    Stress: None   Relationships    Social connections     Talks on phone: None     Gets together: None     Attends Caodaism service: None     Active member of club or organization: None     Attends meetings of clubs or organizations: None     Relationship status: None    Intimate partner violence     Fear of current or ex partner: None     Emotionally abused: None     Physically abused: None     Forced sexual activity: None   Other Topics Concern    None   Social History Narrative    None        REVIEW OF SYSTEMS      Allergies   Allergen Reactions    Mangifera [employer]ica      Pt's throat swells up       No current facility-administered medications on file prior to encounter.       Current Outpatient Medications on File Prior to Encounter   Medication Sig Dispense Refill    hydrOXYzine (ATARAX) 25 MG tablet Take 25 mg by mouth 3 times daily as needed for Itching      acetaminophen-codeine (TYLENOL/CODEINE #3) 300-30 MG per tablet Take 1 tablet by mouth 3 times daily as needed for Pain 10 tablet 0    VERAPAMIL HCL PO Take 120 mg by mouth daily       zonisamide (ZONEGRAN) 100 MG capsule Take 200 mg by mouth 2 times daily      SUMAtriptan (IMITREX) 100 MG tablet Take 100 mg by mouth once as needed for Migraine      citalopram (CELEXA) 20 MG tablet Take 20 mg by mouth daily      FLUoxetine (PROZAC) 40 MG capsule Take 40 mg by mouth daily      ARIPiprazole (ABILIFY) 5 MG tablet Take 5 mg by mouth daily                   General health:  Fairly good. No fever or chills. Skin:  No itching, redness or rash. Head, eyes, ears, nose, throat:  No headache, epistaxis, rhinorrhea, hearing loss or sore throat. Neck:  No pain, stiffness or masses. Cardiovascular/Respiratory system:  No chest pain, palpitation, shortness of breath, coughing or expectoration. Gastrointestinal tract: No abdominal pain, nausea, vomiting, dysphagia, diarrhea or constipation. Genitourinary:  No burning on micturition. No hesitancy, urgency, frequency or discoloration of urine. Locomotor:  No bone or joint pains. No swelling or deformities. Neuropsychiatric:  See HPI. GENERAL PHYSICAL EXAM:     Vitals: BP (!) 141/73   Pulse 77   Temp 97.2 °F (36.2 °C) (Oral)   Resp 14   Ht 5' 8\" (1.727 m)   Wt 285 lb (129.3 kg)   SpO2 95%   BMI 43.33 kg/m²  Body mass index is 43.33 kg/m². Pt was examined with a nurse present in the room. GENERAL APPEARANCE:  Pee Cruz is 25 y.o.,  male, moderately obese, nourished, conscious, alert. Does not appear to be in any distress or pain at this time. SKIN:  Warm, dry, no cyanosis or jaundice. HEAD:  Normocephalic, atraumatic. EYES:  Pupils equal, reactive to light, Conjunctiva is clear, EOMs intact lny. eyelids WNL. EARS:  No discharge, no marked hearing loss. NOSE:  No rhinorrhea, epistaxis or septal deformity. THROAT:  Mucus membranes moist, no erythema or exudate. NECK:  No stiffness, trachea central.  No palpable masses or L.N.      CHEST:  Symmetrical and equal on expansion. HEART:  Regular rate and rhythm.  S1 > S2, No audible murmurs or gallops. LUNGS:  Equal on expansion, normal breath sounds. No wheezing, rhonchi or rales. ABDOMEN: NABS x 4 quads. Obese. Soft on palpation. No localized tenderness. No guarding or rigidity. LYMPHATICS:  No palpable cervical lymphadenopathy. LOCOMOTOR, BACK AND SPINE:  No tenderness or deformities. EXTREMITIES:  Symmetrical, no pretibial edema. No calf tenderness. No discoloration or ulcerations. NEUROLOGIC:  The patient is conscious, alert, oriented, Cranial nerve II-XII intact, taste and smell were not examined. No apparent focal sensory or motor deficits. Muscle strength equal Alex. No facial droop, tongue protrudes centrally, no slurring of the speech. PROVISIONAL DIAGNOSES:      Principal Problem:    Schizoaffective disorder ([address]  Resolved Problems:    * No resolved hospital problems.  RON Manley CNP on 7/8/2020 at 11:25 AM

## 2020-07-08 NOTE — PLAN OF CARE
Problem: Altered Mood, Depressive Behavior:  Goal: Absence of self-harm  Description: Absence of self-harm  7/8/2020 1424 by Ziggy Ambriz LPN  Outcome: Ongoing   Pt is free from self harm. Pt denies suicidal ideations. Pt. Remains on q15 min checks and frequent spontaneous checks throughout shift. Pt. Safety maintained. Problem: Suicide risk  Goal: Provide patient with safe environment  Description: Provide patient with safe environment  7/8/2020 1424 by Ziggy Ambriz LPN  Outcome: Ongoing    Pt agrees to seeking out staff for any needs. Pt agrees to feeling safe on the unit and seeking out staff if thoughts of self harm should arise.

## 2020-07-08 NOTE — PLAN OF CARE
5 [facility]  Day 3 Interdisciplinary Treatment Plan NOTE    Review Date & Time: 7/8/2020 0930    Admission Type:   Admission Type: Involuntary    Reason for admission:  Reason for Admission: Suicidal ideations, Homicidal ideations to stab himself and mother.    Estimated Length of Stay: 5-7 days  Estimated Discharge Date Update: to be determined by physician    PATIENT STRENGTHS:  Patient Strengths Strengths: No significant Physical Illness, Connection to output provider, Communication, Medication Compliance, Social Skills  Patient Strengths and Limitations:Limitations: Difficult relationships / poor social skills, Hopeless about future, Difficulty problem solving/relies on others to help solve problems, External locus of control  Addictive Behavior:Addictive Behavior  In the past 3 months, have you felt or has someone told you that you have a problem with:  : None  Do you have a history of Chemical Use?: No  Do you have a history of Alcohol Use?: No  Do you have a history of Street Drug Abuse?: No  Histroy of Prescripton Drug Abuse?: No  Medical Problems:  Past Medical History:   Diagnosis Date    Hypertension     Prader-Willi syndrome     Seizures ([facility] [address]     epilepsy       Risk:  Fall RiskTotal: 75  Juan Scale Juan Scale Score: 22  BVC Total: 0  Change in scores no Changes to plan of Care no    Status EXAM:   Status and Exam  Normal: No  Facial Expression: Worried, Sad  Affect: Constricted  Level of Consciousness: Alert  Mood:Normal: No  Mood: Anxious, Sad, Helpless  Motor Activity:Normal: No  Motor Activity: Decreased  Interview Behavior: Cooperative  Preception: [place] to Person, Peggyann Dynes to Time, [place] to Place, [place] to Situation  Attention:Normal: No  Attention: Distractible  Thought Processes: Circumstantial  Thought Content:Normal: No  Thought Content: Preoccupations  Hallucinations: None  Delusions: No  Memory:Normal: No  Memory: Poor Recent, Poor Remote  Insight and Judgment: No  Insight UPDATE:   Time frame for Short-Term Goals: 5-7 days    LONG-TERM GOALS UPDATE:   Time frame for Long-Term Goals: 6 months  Members Present in Team Meeting: See Signature Sheet    [place], [address]

## 2020-07-08 NOTE — PLAN OF CARE
Problem: Altered Mood, Depressive Behavior:  Goal: Absence of self-harm  Description: Absence of self-harm  7/8/2020 0318 by Ifeoma Cantrell LPN  Outcome: Ongoing   Patient remained absent of self-harm as evidenced by patient requesting activities to keep himself from being isolative in his room. Patient was unable to rate his anxiety or depression on a scale of 0 to 10. Patient denies visual or auditory hallucinations. Problem: Suicide risk  Goal: Provide patient with safe environment  Description: Provide patient with safe environment  7/8/2020 0318 by Ifeoma Cantrell LPN  Outcome: Ongoing   Patient was provided with a safe environment as evidenced by continual 15 minute rounding for patient safety, will continue and monitor. Patient denies suicidal and homicidal ideations. Patient sated he will notify staff immediately if he develops a plan prior to acting on it.

## 2020-07-08 NOTE — GROUP NOTE
Group Therapy Note    Date: 7/8/2020    Group Start Time: 1100  Group End Time: 1140  Group Topic: Psychotherapy    JAKE Vizcaino        Group Therapy Note    Attendees: 5/11         Patient's Goal:  To focus on the here and now with mental health stability. Verbalize acceptance of situations they cannot control. Status After Intervention:  Unchanged    Participation Level:  Active Listener and Interactive    Participation Quality: Appropriate, Attentive, Sharing and Supportive      Speech:  normal      Thought Process/Content: Linear      Affective Functioning: Congruent      Mood: anxious      Level of consciousness:  Alert, Oriented x4 and Attentive      Response to Learning: Progressing to goal      Endings: None Reported    Modes of Intervention: Education, Support, Socialization and Exploration      Discipline Responsible: /Counselor      Signature:  Claiborne Dandy, LSW

## 2020-07-09 NOTE — PLAN OF CARE
Problem: Anger Management/Homicidal Ideation:  Goal: Able to display appropriate communication and problem solving  Description: Able to display appropriate communication and problem solving  7/8/2020 2157 by Arianna Guerrero LPN  Outcome: Ongoing  Note: Patient denies suicidal thoughts and hallucinations. He reports increased depression and anxiety related to his friend \"being buried today\" due to an overdose. He stated he talked with a few friends and family and was able to vent some of his feelings and sadness to them. He has been out in the milieu, selectively social and calm. Q15min safety checks continue     Problem: Altered Mood, Depressive Behavior:  Goal: Absence of self-harm  Description: Absence of self-harm  7/8/2020 2157 by Arianna Guerrero LPN  Outcome: Ongoing  Note: Patient denies thoughts of self harm at this time. Patient agrees to seek out staff if they begin having thoughts of harming self or they need to talk.  Q15min safety checks continue

## 2020-07-09 NOTE — GROUP NOTE
Group Therapy Note    Date: 7/9/2020    Group Start Time: 1000  Group End Time: 1652  Group Topic: Recreational    FRANCISCO DENSON JOCELYNE    [place], [place]    Attendees: 6         Patient's Goal:  To demonstrate increased interpersonal skills. Notes:  Patient attended group and actively participated in task at hand. Patient conversed appropriately with peers and Richard Selfk. Status After Intervention:  Improved    Participation Level:  Active Listener and Interactive    Participation Quality: Appropriate, Attentive and Sharing      Speech:  normal      Thought Process/Content: Logical      Affective Functioning: Congruent      Mood: euthymic      Level of consciousness:  Alert, Oriented x4 and Attentive      Response to Learning: Able to verbalize current knowledge/experience, Able to verbalize/acknowledge new learning, Able to retain information, Capable of insight and Progressing to goal      Endings: None Reported       Modes of Intervention: Socialization, Exploration, Clarifying, Problem-solving, Activity, Limit-setting and Reality-testing      Discipline Responsible: Psychoeducational Specialist      Signature:  Abdifatah Ortiz

## 2020-07-09 NOTE — VIRTUAL HEALTH
Psychiatric Progress Note - Psychiatric Nurse Practitioner      Pertinent History & Psychiatric Examination    HPI:  Ezequiel Pickering was interviewed via telehealth with staff present due to [place]. He is casually dressed today. He reports that he is feeling good and that he is not depressed or anxious. He is not having any suicidal or homicidal thoughts. He denies auditory or visual hallucinations. He is sleeping good and eating well. He is attending groups. He feels that he is ready for discharge at this time. He will be going to live with a cousin at discharge and that will be verified. Chart and medications reviewed. Therapeutic support, empathetic care and psycho education provided. At this time there is no safe alternative other than inpatient care. Complaints of Pain: none  Functioning Relationships: good support system      Mental Status Evaluation:  Orientation: alertness: alert   Mood:. euthymic      Affect:  normal      Appearance:  overweight   Activity:  Within Normal Limits   Gait/Posture: Normal   Speech:  normal pitch and normal volume   Thought Process:  organized   Thought Content:  No abnormal thoughts   Sensorium:  person, place, time/date and situation   Cognition:  grossly intact   Memory: intact   Insight:  limited   Judgment: limited   Suicidal Ideations: denies suicidal ideation   Homicidal Ideations: Negative for homicidal ideation      Medication Side Effects: absent       Attention Span attention span and concentration were age appropriate     Clinical Assessment Medical Decision    Axis I: Schizoaffective Disorder        Precautions with Justification:   Watch with food    Medication Review/Mgmt: Medications reviewed - no changes    Medical Issues: See Chart    Assessment of Risk for Harm to Self/Others:  None    PLAN  · Continue inpatient psychiatric treatment  · Supportive therapy with medication management.  Reviewed risks and benefits as well as potential side effects with patient. · Review medications for efficacy and side effects. · Therapeutic support and empathetic care provided  · Engage in therapeutic activities and groups. · [place] [facility] [place] [place] symptoms stabilized. Anticipated Discharge Date: 7-    Patient's Response to Treatment: positive    Hui Seqeuira  7/9/2020  7:33 AM           Patient Location:  [address] [place] [address]    Provider Location ([facility] [place], [place]    This virtual visit was conducted via interactive/real-time audio/video.

## 2020-07-09 NOTE — GROUP NOTE
Group Therapy Note    Date: 7/9/2020    Group Start Time: 8988  Group End Time: 0930  Group Topic: Community Meeting    FRANCISCO Burrows, [address]    Attendees: 11         Patient's Goal:  To be informed of group programming schedule for today and reiteration of unit guidelines. To verbalize obtainable short term goal for today pertaining to mental health and stability. Notes:  Patient verbalized goal for today to work on discharge planning and attend groups. Status After Intervention:  Improved    Participation Level:  Active Listener and Interactive    Participation Quality: Appropriate, Attentive and Sharing      Speech:  normal      Thought Process/Content: Logical      Affective Functioning: Congruent      Mood: euthymic      Level of consciousness:  Alert, Oriented x4 and Attentive      Response to Learning: Able to verbalize current knowledge/experience, Able to verbalize/acknowledge new learning, Able to retain information, Capable of insight and Progressing to goal      Endings: None Reported       Modes of Intervention: Support, Socialization, Exploration, Clarifying, Confrontation, Limit-setting and Reality-testing      Discipline Responsible: Psychoeducational Specialist      Signature:  Aidan Cuba

## 2020-07-09 NOTE — BH NOTE
1:1 interview was done via Telehealth by Raúl Grimes RN, NP. Discussed discharge planning. Plan is to discharge on Friday.

## 2020-07-09 NOTE — PROGRESS NOTES
CLINICAL PHARMACY NOTE: MEDS TO [address] Select Patient?: No  Total # of Prescriptions Filled: 1   The following medications were delivered to the patient:  · ClonidineHCL  Total # of Interventions Completed: 0  Time Spent (min): 15    Additional Documentation:

## 2020-07-09 NOTE — GROUP NOTE
Group Therapy Note    Date: 7/9/2020    Group Start Time: 1100  Group End Time: 1140  Group Topic: Psychotherapy    JAKE Vizcaino        Group Therapy Note    Attendees: 7/24         Patient's Goal:  To focus on the here and now with mental health stability. Verbalize acceptance of situations they cannot control. Status After Intervention:  Unchanged    Participation Level:  Active Listener and Interactive    Participation Quality: Appropriate, Attentive, Sharing and Supportive      Speech:  normal      Thought Process/Content: Linear      Affective Functioning: Congruent      Mood: anxious      Level of consciousness:  Alert, Oriented x4 and Attentive      Response to Learning: Progressing to goal      Endings: None Reported    Modes of Intervention: Education, Support, Socialization and Exploration      Discipline Responsible: /Counselor      Signature:  JAKE Galaviz

## 2020-07-09 NOTE — FLOWSHEET NOTE
*Patient participated in the [address] [employer] Service       07/09/20 1521   Encounter Summary   Services provided to: Patient   Referral/Consult From: Rounding   Continue Visiting   (7/9/20)   Complexity of Encounter Moderate   Length of Encounter 30 minutes   Spiritual Assessment Completed Yes   Spiritual/Lutheran   Type Spiritual support   Assessment Calm; Approachable   Intervention Active listening;Prayer   Outcome Receptive;Engaged in conversation;Expressed gratitude

## 2020-07-09 NOTE — GROUP NOTE
Group Therapy Note    Date: 7/9/2020    Group Start Time: 1330  Group End Time: 2607  Group Topic: Music Therapy    FRANCISCO DONALDSONI A    Britton Philippe        Group Therapy Note    Attendees: 11/24       Patient's Goal:  To increase cognitive stimulation and self-esteem through song analysis and rephrasing into positive affirmations. Notes:  Pt attended and participated in group. Status After Intervention:  Improved    Participation Level:  Active Listener and Interactive    Participation Quality: Appropriate, Attentive and Sharing      Speech:  normal      Thought Process/Content: Logical  Linear      Affective Functioning: Congruent      Mood: euthymic      Level of consciousness:  Inattentive      Response to Learning: Able to verbalize current knowledge/experience, Capable of insight and Progressing to goal      Endings: None Reported    Modes of Intervention: Education, Exploration, Activity and Reality-testing      Discipline Responsible: Psychoeducational Specialist      Signature:  Britton Philippe

## 2020-07-10 NOTE — GROUP NOTE
Group Therapy Note    Date: 7/10/2020    Group Start Time: 1000  Group End Time: 7643  Group Topic: Music Therapy    [facility]    Sreedhar Gutiérrez        Group Therapy Note    Attendees: 9/12         Patient's Goal:  To stimulate cognitive thinking while engaging in leisure activities    Notes:  Pt attended and participated in group    Status After Intervention:  Improved    Participation Level:  Active Listener and Interactive    Participation Quality: Appropriate and Attentive      Speech:  normal      Thought Process/Content: Logical  Linear      Affective Functioning: Congruent      Mood: euthymic      Level of consciousness:  Alert and Attentive      Response to Learning: Progressing to goal      Endings: None Reported    Modes of Intervention: Problem-solving, Activity and Reality-testing      Discipline Responsible: Psychoeducational Specialist      Signature:  Sreedhar Gutiérrez

## 2020-07-10 NOTE — GROUP NOTE
Group Therapy Note    Date: 7/10/2020    Group Start Time: 0900  Group End Time: 0915  Group Topic: Community Meeting    [facility]    [place], [place]        Group Therapy Note    Attendees: 10/24         Patient's Goal:  To increase interpersonal interaction. Notes:  Pt attended and participated in group. Status After Intervention:  Improved    Participation Level:  Active Listener and Interactive    Participation Quality: Appropriate, Attentive and Sharing      Speech:  normal      Thought Process/Content: Logical      Affective Functioning: Congruent      Mood: euthymic      Level of consciousness:  Alert and Attentive      Response to Learning: Able to verbalize current knowledge/experience, Able to retain information and Progressing to goal      Endings: None Reported    Modes of Intervention: Education, Support, Socialization, Exploration, Problem-solving and Reality-testing      Discipline Responsible: Psychoeducational Specialist      Signature:  Rita Kwon

## 2020-07-10 NOTE — VIRTUAL HEALTH
DISCHARGE SUMMARY  Patient ID:  Betty Hills  [id number]  55 y.o.  1995    Admit date: 7/6/2020    Discharge date and time: 7/10/2020  9:37 AM     Admitting Physician: Eve Hope MD     Discharge Physician:  Louanne Kehr, APRN - CNP    Admission Diagnoses: Schizoaffective disorder ([facility] [address] [F25.9]  Schizoaffective disorder ([facility] [address] [facility] [F25.9]    Discharge Diagnoses:   Schizoaffective disorder [facility])     Patient Active Problem List   Diagnosis Code    Chest pain R07.9    Prader-Willi syndrome Q87.11    Schizoaffective disorder ([facility] [address] F25.9        Admission Condition: poor    Discharged Condition: stable    Indication for Admission: threat to self    History of Present Illnes (present tense wording indicates findings from admission exam on 7/6/2020 and are not necessarily indicative of current findings):   Betty Hills was admitted from [facility] on a pink slip for suicidal thoughts towards his mother who is his payee. Hospital Course:   Upon admission, he was provided a safe secure environment, introduced to unit milieu. Patient participated in groups and individual therapies. Meds were adjusted. After few days of hospital care, patient began to feel improvement. Depression lifted, thoughts to harm self ceased. Sleep improved, appetite was good. On morning rounds 7/10/2020, patient endorsed feeling ready for discharge. Patient denies suicidal or homicidal ideations, denies hallucinations or delusions. Denies SE's from meds. It was decided that pt had achieved maximum benefit from hospital care and can be discharged to home. Consults:   None    Significant Diagnostic Studies: Routine labs and diagnostics    Treatments: Psychotropic medications, therapy with group, milieu, and 1:1 with nurses, social workers, PAJenniferC/CNP, and Attending physician.       Discharge Medications:  Current Discharge Medication List      START taking these medications    Details   traZODone (DESYREL) 50 MG tablet Take admission. Discharge planning, findings, and recommendations were discussed with patient and treatment team.    Signed:  Kenn Camacho   7/10/2020  9:37 AM        Patient Location:  [address] [place] [address]    Provider Location ([facility]): [place], [place]    This virtual visit was conducted via interactive/real-time audio/video.

## 2020-07-11 NOTE — DISCHARGE SUMMARY
tablet by mouth nightly as needed for Sleep  Qty: 14 tablet, Refills: 0      cloNIDine (CATAPRES) 0.1 MG tablet Take 1 tablet by mouth nightly  Qty: 14 tablet, Refills: 0         CONTINUE these medications which have NOT CHANGED    Details   hydrOXYzine (ATARAX) 25 MG tablet Take 25 mg by mouth 3 times daily as needed for Itching      acetaminophen-codeine (TYLENOL/CODEINE #3) 300-30 MG per tablet Take 1 tablet by mouth 3 times daily as needed for Pain  Qty: 10 tablet, Refills: 0      VERAPAMIL HCL PO Take 120 mg by mouth daily       zonisamide (ZONEGRAN) 100 MG capsule Take 200 mg by mouth 2 times daily      SUMAtriptan (IMITREX) 100 MG tablet Take 100 mg by mouth once as needed for Migraine      FLUoxetine (PROZAC) 40 MG capsule Take 40 mg by mouth daily      ARIPiprazole (ABILIFY) 5 MG tablet Take 5 mg by mouth daily         STOP taking these medications       citalopram (CELEXA) 20 MG tablet Comments:   Reason for Stopping:                  Core Measures statement:   Not applicable    Discharge Exam:  Level of consciousness:  Within normal limits  Appearance: Street clothes, seated, with good grooming  Behavior/Motor: No abnormalities noted  Attitude toward examiner:  Cooperative, attentive, good eye contact  Speech:  spontaneous, normal rate, normal volume and well articulated  Mood:  euthymic  Affect:  mood congruent  Thought processes:  linear, goal directed and coherent  Thought content:  Homocidal ideation denies  Suicidal Ideation:  denies suicidal ideation  Delusions:  no evidence of delusions  Perceptual Disturbance:  denies any perceptual disturbance  Cognition:  Intact  Memory: age appropriate  Insight & Judgement: fair  Medication side effects:  denies     Disposition: home    Patient Instructions:    Activity: activity as tolerated    Follow-up as scheduled with [facility]    Time Spent: 15 minutes    Engagement: Patient displayed a good level of engagement with the treatments offered during this admission. Discharge planning, findings, and recommendations were discussed with patient and treatment team.    Signed:  Sayra Christopher   7/10/2020  9:43 PM        Patient Location:  [address] [place] [address]    Provider Location ([facility]): [place], [place]    This virtual visit was conducted via interactive/real-time audio/video.

## 2020-07-29 NOTE — ED NOTES
Patient arrives to the ED for complaints of a rash. Denies any injury, denies pain. States he noticed it last week. It has been itching. It is red, soft. Patient states it goes from his upper calf to above his knee. Denies tenderness upon exam. Tissue soft, red. Resting quietly, call light in reach.       Adrian Mcallister RN  07/29/20 1271

## 2020-07-29 NOTE — ED PROVIDER NOTES
[facility] ED  [address] [facility]  Phone: [phone]  EMERGENCY DEPARTMENT ENCOUNTER      Pt Name: Ginna Peña  MRN: [id number]  [place] 1995  Date of evaluation: 7/29/2020    CHIEF COMPLAINT       Chief Complaint   Patient presents with    Cellulitis     L calf with redness, itches. radiates up his leg. denies fever. noticed it last week. HISTORY OF PRESENT ILLNESS    Ginna Peña is a 25 y.o. male who presents To the emergency department with a rash on the back of his left leg that is red and itches that he noticed last week when he got out of the hospital.  Worsening redness. No fevers or chills no paresthesias or weakness no other symptoms. REVIEW OF SYSTEMS       Constitutional: No fevers or chills   HEENT: No sore throat, rhinorrhea, or earache   Eyes: No blurry vision or double vision no drainage   Cardiovascular: No chest pain or tachycardia   Respiratory: No wheezing or shortness of breath no cough   Gastrointestinal: No nausea, vomiting, diarrhea, constipation, or abdominal pain   : No hematuria or dysuria   Musculoskeletal: No swelling or pain   Skin: Left leg rash  Neurological: No focal neurologic complaints, paresthesias, weakness, or headache     PAST MEDICAL HISTORY    has a past medical history of Depression, Hypertension, Prader-Willi syndrome, and Seizures ([address] SURGICAL HISTORY      has a past surgical history that includes fracture surgery and Adenoidectomy.     CURRENT MEDICATIONS       Previous Medications    ACETAMINOPHEN-CODEINE (TYLENOL/CODEINE #3) 300-30 MG PER TABLET    Take 1 tablet by mouth 3 times daily as needed for Pain    ARIPIPRAZOLE (ABILIFY) 10 MG TABLET    Take 10 mg by mouth daily    CLONIDINE (CATAPRES) 0.1 MG TABLET    Take 1 tablet by mouth nightly    FLUOXETINE (PROZAC) 40 MG CAPSULE    Take 40 mg by mouth daily    HYDROXYZINE (ATARAX) 25 MG TABLET    Take 25 mg by mouth 3 times daily as needed for Itching SUMATRIPTAN (IMITREX) 100 MG TABLET    Take 100 mg by mouth once as needed for Migraine    TRAZODONE (DESYREL) 50 MG TABLET    Take 1 tablet by mouth nightly as needed for Sleep    VERAPAMIL HCL PO    Take 120 mg by mouth daily     ZONISAMIDE (ZONEGRAN) 100 MG CAPSULE    Take 200 mg by mouth 2 times daily       ALLERGIES     is allergic to mangifera indica. FAMILY HISTORY     has no family status information on file. family history is not on file. SOCIAL HISTORY      reports that he has never smoked. He has never used smokeless tobacco. He reports that he does not drink alcohol or use drugs. PHYSICAL EXAM       ED Triage Vitals [07/29/20 1359]   BP Temp Temp Source Pulse Resp SpO2 Height Weight   137/81 98.4 °F (36.9 °C) Oral 87 17 95 % 5' 9\" (1.753 m) (!) 329 lb (149.2 kg)       Constitutional: Alert, oriented x3, nontoxic, answering questions appropriately, acting properly for age, in no acute distress   HEENT: Extraocular muscles intact,   Neck: Trachea midline   Cardiovascular: Regular rhythm and rate no S3, S4, or murmurs   Respiratory: Clear to auscultation bilaterally no wheezes, rhonchi, rales, no respiratory distress no tachypnea no retractions no hypoxia  Gastrointestinal: Soft, nontender, nondistended, positive bowel sounds. No rebound, rigidity, or guarding. Musculoskeletal: Left lower extremity no pain at the knee or ankle. There is a small skin defect measuring approximately . 5cm with surrounding erythema measuring approximately 8 cm in diameter. No purulent drainage. Neurologic: Moving all 4 extremities without difficulty there are no gross focal neurologic deficits   Skin: Warm and dry     Physical Exam  Musculoskeletal:        Legs:            DIFFERENTIAL DIAGNOSIS/ MDM:     Left lower extremity cellulitis. Will treat with antibiotics. Follow-up and return if worsening symptoms or other concerns.     DIAGNOSTIC RESULTS     EKG: All EKG's are interpreted by the Emergency Rabia,, DO  Attending Emergency Physician       Hamlet Notice, DO  07/29/20 1428

## 2020-07-30 NOTE — CARE COORDINATION
1st outreach for ED follow up/ COVID screening.  No answer, voicemail hasn't been set up and unable to leave a message

## 2020-07-30 NOTE — CARE COORDINATION
Patient contacted regarding recent discharge and COVID-19 risk. Discussed COVID-19 related testing which was not done at this time. Test results were not done. Patient informed of results, if available?      Care Transition Nurse/ Ambulatory Care Manager contacted the patient by telephone to perform post discharge assessment. Verified name and  with patient as identifiers. Patient has following risk factors of: no known risk factors. CTN/ACM reviewed discharge instructions, medical action plan and red flags related to discharge diagnosis. Reviewed and educated them on any new and changed medications related to discharge diagnosis. Advised obtaining a 90-day supply of all daily and as-needed medications. Education provided regarding infection prevention, and signs and symptoms of COVID-19 and when to seek medical attention with patient who verbalized understanding. Discussed exposure protocols and quarantine from [address] [place], [place] [address]y you at higher risk for severe illness  and given an opportunity for questions and concerns. The patient agrees to contact the COVID-19 hotline [phone] or PCP office for questions related to their healthcare. CTN/ACM provided contact information for future reference. From CDC: Are you at higher risk for severe illness?  Wash your hands often.  Avoid close contact (6 feet, which is about two arm lengths) with people who are sick.  Put distance between yourself and other people if COVID-19 is spreading in your community.  Clean and disinfect frequently touched surfaces.  Avoid all cruise travel and non-essential air travel.  Call your healthcare professional if you have concerns about COVID-19 and your underlying condition or if you are sick.     For more information on steps you can take to protect yourself, see CDC's How to [address] [place] [address] [facility]     Plan for follow-up call in 7-14 days based on severity of symptoms and risk factors. Spoke to patient, reports no new or worsening symptoms. Filled his prescriptions. Declined reviewing advanced care planning.  Encouraged following up with providers

## 2020-08-12 NOTE — CARE COORDINATION
You Patient resolved from the Care Transitions episode on  8/12/20  Discussed COVID-19 related testing which was not done at this time. Test results were not done. Patient informed of results, if available? NA    Patient/family has been provided the following resources and education related to COVID-19:                         Signs, symptoms and red flags related to COVID-19            [facility] exposure and quarantine guidelines            [facility] exposure contact - [phone]            Contact for their local Department of Health                 Patient currently reports that the following symptoms have improved:  per patient, he's feeling fine and symptoms have resolved  and no new/worsening symptoms     No further outreach scheduled with this CTN/ACM. Episode of Care resolved. Patient has this CTN/ACM contact information if future needs arise.

## 2020-08-12 NOTE — CARE COORDINATION
Attempted 2 week ED follow up call.  No answer, voicemail hasn't been set up and unable to leave a message     Episode resolved

## 2020-09-22 PROBLEM — T46.1X1A CALCIUM CHANNEL BLOCKER OVERDOSE: Status: ACTIVE | Noted: 2020-01-01

## 2020-09-22 NOTE — ED NOTES
Pt given 1 amp bicarb, 1 gram of calcium chloride, and 0.6 mg atropine      Angelica Mary RN  09/22/20 0645

## 2020-09-22 NOTE — ED NOTES
Bed: 13  Expected date: 9/22/20  Expected time: 1:47 AM  Means of arrival:   Comments:  LS3     Bernard Hernandez RN  09/22/20 0157

## 2020-09-22 NOTE — H&P
Critical Care - History and Physical Examination    Patient's name:  Pee Cruz  Medical Record Number: [id number]  Patient's account/billing number: [de-identified]  Patient's YOB: 1995  Age: 22 y.o. Date of Admission: 9/22/2020  1:57 AM  Date of History and Physical Examination: 9/22/2020      Primary Care Physician: Job Fournier  Attending Physician:    Code Status: Prior    Chief complaint: calcium channel blocker overdose    HISTORY OF PRESENT ILLNESS:   History was obtained from chart review. Pee Cruz is a 22 y.o. male with history of Prader-Willi, depression, HTH, epilepsy who presented to the ED after an intentional overdose with his home medications verapamil, trazodone, Imitrex. Patient notified EMS, found patient alert and oriented and transported patient to the emergency department. In the ED, patient was altered on exam, limited history was obtained. Patient reported taking approximately 90 tablets of 120 mg of verapamil, unknown amount of trazodone, unknown amount of Imitrex. Patient was hypotensive, bradycardic, with wide QRS complexes on EKG. Patient was intubated, given 2 L normal saline fluid bolus, 3 g calcium chloride, 2 amps of bicarb, 0.6 mg atropine x2, activated charcoal was given via OG tube. Patient was started on epinephrine and Levophed drip for blood pressure control, started on insulin drip and D10 drip at 150 mL/h. Labs were notable for potassium 3.4, chloride 108, normal kidney function with BUN 11, creatinine 0.86, calcium 10.9, , myoglobin 42, liver enzymes within normal limits, slight leukocytosis at 17.1, hemoglobin 13.2, platelets 459. Chest x-ray was notable for ET tube needing advancement, interstitial opacities. Poison control was notified, who recommended following the algorithm for calcium channel blocker overdose.   Patient received a GI decontamination, IV fluids for hypotension, atropine for bradycardia, calcium for worsening shock, insulin and glucose drips and vasopressors for persistent shock. Patient persisted to have hypotension, administered glucagon with minimal response. Currently in discussion with  regarding lipid emulsion therapy. Past Medical History:        Diagnosis Date    Depression     Hypertension     Prader-Willi syndrome     Seizures ([facility] [address]     epilepsy       Past Surgical History:        Procedure Laterality Date    ADENOIDECTOMY      FRACTURE SURGERY         Allergies: Allergies   Allergen Reactions    Mangifera Indica      Pt's throat swells up         Home Meds:   Prior to Admission medications    Medication Sig Start Date End Date Taking? Authorizing Provider   ARIPiprazole (ABILIFY) 10 MG tablet Take 10 mg by mouth daily 7/21/20 8/20/20  Historical Provider, MD   traZODone (DESYREL) 50 MG tablet Take 1 tablet by mouth nightly as needed for Sleep 7/9/20   RON Marina CNP   cloNIDine (CATAPRES) 0.1 MG tablet Take 1 tablet by mouth nightly 7/9/20   RON Marina CNP   hydrOXYzine (ATARAX) 25 MG tablet Take 25 mg by mouth 3 times daily as needed for Itching    Historical Provider, MD   FLUoxetine (PROZAC) 40 MG capsule Take 40 mg by mouth daily    Historical Provider, MD   acetaminophen-codeine (TYLENOL/CODEINE #3) 300-30 MG per tablet Take 1 tablet by mouth 3 times daily as needed for Pain 10/20/15   Heriberto Alaniz,    VERAPAMIL HCL PO Take 120 mg by mouth daily     Historical Provider, MD   zonisamide (ZONEGRAN) 100 MG capsule Take 200 mg by mouth 2 times daily    Historical Provider, MD   SUMAtriptan (IMITREX) 100 MG tablet Take 100 mg by mouth once as needed for Migraine    Historical Provider, MD       Social History:   TOBACCO:   reports that he has never smoked. He has never used smokeless tobacco.  ETOH:   reports no history of alcohol use. DRUGS:  reports no history of drug use.   OCCUPATION:      Family History:   No family history on file.        REVIEW OF SYSTEMS (ROS):  Review of Systems -   Unable to obtain secondary to condition      Physical Exam:    Vitals: BP (!) 114/49   Pulse 55   Resp 20   Ht 5' 9\" (1.753 m)   Wt (!) 328 lb 14.8 oz (149.2 kg)   SpO2 91%   BMI 48.57 kg/m²     Last Body weight:   Wt Readings from Last 3 Encounters:   09/22/20 (!) 328 lb 14.8 oz (149.2 kg)   07/29/20 (!) 329 lb (149.2 kg)       Body Mass Index : Body mass index is 48.57 kg/m². PHYSICAL EXAMINATION :  Constitutional: Intubated and sedated  EENT: PERRLA, sclera clear, anicteric, endotracheal tube in place, neck supple with midline trachea. Neck: Supple, symmetrical, trachea midline, no adenopathy, thyroid symmetric, no jvd skin normal  Respiratory: clear to auscultation, no wheezes or rales and unlabored breathing  Cardiovascular: Bradycardic and regular rhythm, normal S1, S2, no murmur noted and 2+ pulses throughout  Abdomen: soft, obese, nondistended, no masses or organomegaly  Extremities:  peripheral pulses normal, no pedal edema, no clubbing or cyanosis    Any additional physical findings:    VENT SETTINGS (Comprehensive) (if applicable):  Vent Information  $Ventilation: $Initial Day  Vent Type: Servo i  Vent Mode: PRVC  Vt Ordered: 660 mL  Rate Set: 20 bmp  FiO2 : 100 %  SpO2: 91 %  SpO2/FiO2 ratio: 91  PEEP/CPAP: 10  I Time/ I Time %: 0.9 s  Humidification Source: E  Additional Respiratory  Assessments  Pulse: 55  Resp: 20  SpO2: 91 %  $End Tidal CO2: 31  Humidification Source: HME    Laboratory findings:-    CBC:   Recent Labs     09/22/20 0222   WBC 17.1*   HGB 13.2        BMP:    Recent Labs     09/22/20  0204 09/22/20 0222   NA  --  141   K  --  3.4*   CL  --  108*   CO2  --  20   BUN  --  11   CREATININE 1.06 0.86   GLUCOSE  --  127*     S. Calcium:  Recent Labs     09/22/20 0222   CALCIUM 10.9*     S. Ionized Calcium:No results for input(s): IONCA in the last 72 hours.   S. Magnesium:  Recent Labs     09/22/20  0220 MG 2.0     S. Phosphorus:No results for input(s): PHOS in the last 72 hours. S. Glucose:  Recent Labs     09/22/20  0204   POCGLU 131*     Glycosylated hemoglobin A1C: No results for input(s): LABA1C in the last 72 hours. INR: No results for input(s): INR in the last 72 hours. Hepatic functions:   Recent Labs     09/22/20  0222   ALKPHOS PENDING   ALT 18   AST 21   PROT PENDING   BILITOT PENDING   LABALBU PENDING     Pancreatic functions:No results for input(s): LACTA, AMYLASE in the last 72 hours. S. Lactic Acid: No results for input(s): LACTA in the last 72 hours. Cardiac enzymes:No results for input(s): CKTOTAL, CKMB, CKMBINDEX, TROPONINI in the last 72 hours. BNP:No results for input(s): BNP in the last 72 hours. Lipid profile: No results for input(s): CHOL, TRIG, HDL, LDLCALC in the last 72 hours.     Invalid input(s): LDL  Blood Gases: No results found for: PH, PCO2, PO2, HCO3, O2SAT  Thyroid functions:   Lab Results   Component Value Date    TSH 2.14 07/08/2020        Urinalysis:     Microbiology:    Cultures during this admission:     Blood cultures:                 [x] None drawn      [] Negative             []  Positive (Details:  )  Urine Culture:                   [x] None drawn      [] Negative             []  Positive (Details:  )  Sputum Culture:               [x] None drawn       [] Negative             []  Positive (Details:  )   Endotracheal aspirate:     [x] None drawn       [] Negative             []  Positive (Details:  )         -----------------------------------------------------------------  Radiological reports:    (See actual reports for details)      Recent Cardiac Catheterization summary:   (See actual reports for details)    Electrocardiogram:     Last Echocardiogram findings:   (See actual reports for details)       Assessment and Plan       Patient Active Problem List   Diagnosis    Chest pain    Prader-Willi syndrome    [facility]    Calcium channel blocker overdose         Neuro:  Suicide attempt  Calcium channel blocker overdose  Intubated and sedated  -Suicide precautions  -Psych consult when appropriate  -Poison control consulted, following calcium channel blocker treatment guidelines  -Versed  -Fentanyl PRN  -Insulin drip  -D10 drip    Cardiovascular:  Hypotensive  Bradycardic  Wide-complex QRS  -Atropine administered in the emergency department  -Epinephrine, Vaso and Levophed for pressors  -Bicarb ministered in the emergency department  -We will consider bicarb drip    Pulmonary:  Intubated  Chest x-ray with bilateral atelectasis versus aspiration  Lungs clear to auscultation  -PRVC/20/660/10/100%  -ABG 7.34/43/56/23  -Repeat ABG  -Bicarb drip as needed    Renal:  Normal kidney function  -Monitor urine output    GI:  -NPO  -Bowel regimen    ID:  Afebrile, slight leukocytosis  -Continue to monitor    Heme/Onc  Hemoglobin and platelets stable  -Continue to monitor    Other:  Montez placed 9/22/2020  Right IJ placed 9/22/2020  Pepcid for ulcer prophylaxis  Lovenox for DVT prophylaxis    Feeding Diet: N.p.o.  Fluids -150 cc/hr D10  Family updated  Analgesic fentanyl   Sedation  Versed  Thrombo-prophylaxis EPCs, lovenox  Mobility none  Heads up 30 Degrees  Ulcer prophylaxis  Pepcid  Glycemic control insulin drip  Spontaneous breathing trial none  Bowel regimen/urine output monitoring  Indwelling catheter/lines-peripheral IV, Montez catheter, right IJ central line, ETT, OG  de-escalation  none        Abraham Mary MD  Emergency Medicine Resident, PGY-2  [address] [place] [address])             9/22/2020, 3:59 AM

## 2020-09-22 NOTE — ED PROVIDER NOTES
650 mg     acetaminophen (TYLENOL) suppository 650 mg    polyethylene glycol (GLYCOLAX) packet 17 g    enoxaparin (LOVENOX) injection 40 mg    senna (SENOKOT) 8.8 MG/5ML syrup 8.8 mg    ondansetron (ZOFRAN) injection 4 mg    famotidine (PEPCID) injection 20 mg    DISCONTD: norepinephrine (LEVOPHED) 16 mg in sodium chloride 0.9 % 250 mL infusion    midazolam (VERSED) injection 4 mg    midazolam (VERSED) 1 mg/mL in D5W infusion    sodium bicarbonate 8.4 % injection 50 mEq    sodium bicarbonate 8.4 % injection 50 mEq    calcium gluconate 10 % injection 1 g    calcium gluconate 10 % injection 1 g    atropine injection 0.6 mg    atropine injection 0.6 mg    glucagon (rDNA) injection 10 mg    vasopressin 20 Units in dextrose 5 % 100 mL infusion    sodium bicarbonate 8.4 % injection 50 mEq    DISCONTD: fat emulsion 20 % infusion 223.8 mL    DISCONTD: fat emulsion 20% fish oil/plant based syringe    fat emulsion 20 % infusion 100 mL    DOPamine (INTROPIN) 400 mg in dextrose 5 % 250 mL infusion    potassium chloride 20 mEq/50 mL IVPB (Central Line)    potassium bicarb-citric acid (EFFER-K) effervescent tablet 40 mEq    magnesium sulfate 1 g in dextrose 5% 100 mL IVPB    insulin regular 500 Units in sodium chloride 0.9 % 500 mL infusion       LABS / RADIOLOGY:     Labs Reviewed   CBC WITH AUTO DIFFERENTIAL - Abnormal; Notable for the following components:       Result Value    WBC 17.1 (*)     Hematocrit 39.9 (*)     Immature Granulocytes 1 (*)     Segs Absolute 11.14 (*)     Absolute Lymph # 4.30 (*)     All other components within normal limits   COMPREHENSIVE METABOLIC PANEL - Abnormal; Notable for the following components:    Glucose 127 (*)     Calcium 10.9 (*)     Potassium 3.4 (*)     Chloride 108 (*)     Total Bilirubin 0.25 (*)     Total Protein 6.1 (*)     Alb 3.4 (*)     All other components within normal limits   HGB/HCT - Abnormal; Notable for the following components:    POC Hemoglobin 12.6 (*)     POC Hematocrit 37 (*)     All other components within normal limits   TOX SCR, BLD, ED - Abnormal; Notable for the following components:    Acetaminophen Level <5 (*)     Salicylate Lvl <1 (*)     All other components within normal limits   CALCIUM, IONIZED - Abnormal; Notable for the following components:    Calcium, Ion 1.46 (*)     All other components within normal limits   LACTIC ACID, WHOLE BLOOD - Abnormal; Notable for the following components:    Lactic Acid, Whole Blood 8.5 (*)     All other components within normal limits   URINALYSIS WITH MICROSCOPIC - Abnormal; Notable for the following components:    Protein, UA TRACE (*)     All other components within normal limits   HGB/HCT - Abnormal; Notable for the following components:    POC Hemoglobin 13.1 (*)     POC Hematocrit 39 (*)     All other components within normal limits   POTASSIUM (POC) - Abnormal; Notable for the following components:    POC Potassium 1.7 (*)     All other components within normal limits   CALCIUM, IONIC (POC) - Abnormal; Notable for the following components:    POC Ionized Calcium 1.40 (*)     All other components within normal limits   VENOUS BLOOD GAS, POINT OF CARE - Abnormal; Notable for the following components:    pO2, Siva 55.5 (*)     O2 Sat, Siva 86 (*)     All other components within normal limits   LACTIC ACID,POINT OF CARE - Abnormal; Notable for the following components:    POC Lactic Acid 4.29 (*)     All other components within normal limits   POCT GLUCOSE - Abnormal; Notable for the following components:    POC Glucose 131 (*)     All other components within normal limits   POC GLUCOSE FINGERSTICK - Abnormal; Notable for the following components:    POC Glucose 273 (*)     All other components within normal limits   POC GLUCOSE FINGERSTICK - Abnormal; Notable for the following components:    POC Glucose 317 (*)     All other components within normal limits   POC GLUCOSE FINGERSTICK - Abnormal; Notable for the following components:    POC Glucose 363 (*)     All other components within normal limits   POC GLUCOSE FINGERSTICK - Abnormal; Notable for the following components:    POC Glucose 386 (*)     All other components within normal limits   ARTERIAL BLOOD GAS, POC - Abnormal; Notable for the following components:    POC pH 7.292 (*)     POC pCO2 34.2 (*)     POC PO2 75.6 (*)     POC HCO3 16.5 (*)     TCO2 (calc), Art 18 (*)     Negative Base Excess, Art 9 (*)     All other components within normal limits   CREATININE W/GFR POINT OF CARE - Abnormal; Notable for the following components:    POC Creatinine 1.75 (*)     GFR Comment 58 (*)     GFR Non- 48 (*)     All other components within normal limits   LACTIC ACID,POINT OF CARE - Abnormal; Notable for the following components:    POC Lactic Acid 8.75 (*)     All other components within normal limits   POCT GLUCOSE - Abnormal; Notable for the following components:    POC Glucose 485 (*)     All other components within normal limits   ANION GAP (CALC) POC - Abnormal; Notable for the following components:    Anion Gap 19 (*)     All other components within normal limits   MAGNESIUM   CK   MYOGLOBIN, SERUM   SODIUM (POC)   POTASSIUM (POC)   CHLORIDE (POC)   CALCIUM, IONIC (POC)   COVID-19   URINE DRUG SCREEN   SODIUM (POC)   CHLORIDE (POC)   POTASSIUM   LACTATE, SEPSIS   LACTATE, SEPSIS   CREATININE W/GFR POINT OF CARE   ANION GAP (CALC) POC   POC BLOOD GAS   POC BLOOD GAS   POCT GLUCOSE   POCT GLUCOSE   POCT GLUCOSE   POCT GLUCOSE   POCT GLUCOSE   POCT GLUCOSE   POCT GLUCOSE   POCT GLUCOSE   POCT GLUCOSE   POCT GLUCOSE   POCT GLUCOSE   POCT GLUCOSE   POCT GLUCOSE   POCT GLUCOSE   POCT GLUCOSE   POCT GLUCOSE   POCT GLUCOSE   POCT GLUCOSE   POCT GLUCOSE   POCT GLUCOSE   POCT GLUCOSE   POCT GLUCOSE   POCT GLUCOSE   POCT GLUCOSE   POCT GLUCOSE   POCT GLUCOSE   POC BLOOD GAS AND CHEMISTRY   POCT GLUCOSE   POCT GLUCOSE   POCT GLUCOSE   POCT GLUCOSE   POCT GLUCOSE       XR CHEST PORTABLE   Final Result   Right IJ CVC in place without apparent complication. Endotracheal tube should be advanced about 2-3 cm. XR CHEST PORTABLE   Final Result   Study limited by the patient's body habitus. ET tube terminating just above the clavicles. The tube may be advanced 2 cm   for optimal positioning. Enteric tube in the gastric fundus. Low lung volumes which accentuate the cardiomediastinal silhouette. Multiple patchy opacities in the bilateral lungs, due to atelectasis,   aspiration, or multifocal pneumonia. No sizable pleural effusion. No pneumothorax             RECENT VITALS:      ,  Pulse: 55, Resp: 20, BP: (!) 114/49, SpO2: 91 %    This patient is a 22 y.o. Male has an overdose with calcium channel blocker. Per report this was an intentional overdose with his home medications verapamil, trazodone, Imitrex. Patient was given calcium chloride, bicarb, atropine and activated charcoal.  He remained bradycardic with wide QRS. He was started on epinephrine and Levophed drip and a central line was placed. Patient was then started on insulin drip and was subsequently intubated for airway protection. Case was discussed with on-call  regarding lipid emulsion therapy which was not given at this time but is at bedside if needed. Patient signed out awaiting transfer to the ICU. OUTSTANDING TASKS / RECOMMENDATIONS:      1. Await Bed placement     FINAL IMPRESSION:     1.  Calcium channel blocker overdose, intentional self-harm, initial encounter [facility])        DISPOSITION:       DISPOSITION:  []  Discharge   []  Transfer -    [x]  Admission - MICU     []  Against Medical Advice   []  Eloped   FOLLOW-UP: Ely Morley  [facility] [address]: Current Discharge Medication List             Kiley Vann, [address] [facility]  Emergency Medicine Resident  [facility] [address] [facility],   Resident  09/22/20 [id number]

## 2020-09-22 NOTE — ED NOTES
Pt given amp of bicarb, 1 gram calcium chloride, 0.6 mg atropine      Angelica Mary RN  09/22/20 [id number]

## 2020-09-22 NOTE — CONSULTS
[facility] Cardiology Cardiology    Inpatient Consultation Note               Today's Date: 9/22/2020  Patient Name: Afshin Lang  Date of admission: 9/22/2020  1:57 AM  Patient's age: 22 y.o., 1995  Admission Dx: Calcium channel blocker overdose, intentional self-harm, initial encounter ([facility] [address] Karlee Helton    Reason for  Consult:  Severe CCBs Toxicity    Requesting Physician: Adam Naqvi MD    CHIEF COMPLAINT:  Medication Overdose, suicide attempt  No chief complaint on file. History Obtained From:  electronic medical record, emergency room resident     HISTORY OF PRESENT ILLNESS:      The patient is a 22 y.o. male [facility] Pranaveed Bakerl, HTN who is admitted to the hospital for medication overdose. Patient attempted suicide via overdose and took an excessive amount of Verapamil and Trazadone. A bottle was found and it was estimated that patient may have taken 90 tablets of 120 mg Verapamil with the amount of Trazadone ingested unknown. Prior to intubation patient also told the emergency department physician that he may have taken Imitrex as well. In the ED, was incoherent. EKG showed wident QRS. Calcium gluconate, bicarb given. Bradycardia noted in 50s, hypotensive, 3 doses atropine given. Continue to be hypotensive, placed on levophed. Breathing status declined, intubated. Poison control called, started on vasopressin. Insulin and glucagon started. Potassium found significantly dropped to 1.9. Insulin stopped, replacement initiated. Patient admitted to ICU. Cardiology was consulted due to concerns over the heart secondary to the overdose. Past Medical History:   has a past medical history of Depression, Hypertension, Prader-Willi syndrome, and Seizures ([facility] [address] Past Surgical History:   has a past surgical history that includes fracture surgery and Adenoidectomy. Home Medications:    Prior to Admission medications    Medication Sig Start Date End Date Taking?  Authorizing Provider   ARIPiprazole (ABILIFY) 10 MG tablet Take 10 mg by mouth daily 7/21/20 8/20/20  Historical Provider, MD   traZODone (DESYREL) 50 MG tablet Take 1 tablet by mouth nightly as needed for Sleep 7/9/20   RON Pike CNP   cloNIDine (CATAPRES) 0.1 MG tablet Take 1 tablet by mouth nightly 7/9/20   RON Pike CNP   hydrOXYzine (ATARAX) 25 MG tablet Take 25 mg by mouth 3 times daily as needed for Itching    Historical Provider, MD   FLUoxetine (PROZAC) 40 MG capsule Take 40 mg by mouth daily    Historical Provider, MD   acetaminophen-codeine (TYLENOL/CODEINE #3) 300-30 MG per tablet Take 1 tablet by mouth 3 times daily as needed for Pain 10/20/15   Heriberto Alaniz,    VERAPAMIL HCL PO Take 120 mg by mouth daily     Historical Provider, MD   zonisamide (ZONEGRAN) 100 MG capsule Take 200 mg by mouth 2 times daily    Historical Provider, MD   SUMAtriptan (IMITREX) 100 MG tablet Take 100 mg by mouth once as needed for Migraine    Historical Provider, MD      Current Facility-Administered Medications: norepinephrine (LEVOPHED) 16 mg in sodium chloride 0.9 % 250 mL infusion, 2 mcg/min, Intravenous, Continuous  glucose (GLUTOSE) 40 % oral gel 15 g, 15 g, Oral, PRN  dextrose 50 % IV solution, 12.5 g, Intravenous, PRN  glucagon (rDNA) injection 1 mg, 1 mg, Intramuscular, PRN  dextrose 5 % solution, 100 mL/hr, Intravenous, PRN  dextrose 10 % 1,000 mL infusion, , Intravenous, Continuous  calcium chloride 10 % injection, , ,   ondansetron (ZOFRAN) 4 MG/2ML injection, , ,   EPINEPHrine (EPINEPHrine HCL) 5 mg in dextrose 5 % 250 mL infusion, 1 mcg/min, Intravenous, Continuous  dextrose 5 % solution, , ,   EPINEPHrine 1 MG/ML injection, , ,   cloNIDine (CATAPRES) tablet 0.1 mg, 0.1 mg, Oral, Nightly  FLUoxetine (PROZAC) capsule 40 mg, 40 mg, Oral, Daily  ARIPiprazole (ABILIFY) tablet 10 mg, 10 mg, Oral, Daily  zonisamide (ZONEGRAN) capsule 200 mg, 200 mg, Oral, BID  sodium chloride flush 0.9 % injection 10 mL, 10 mL, Intravenous, 2 times per day  sodium chloride flush 0.9 % injection 10 mL, 10 mL, Intravenous, PRN  acetaminophen (TYLENOL) tablet 650 mg, 650 mg, Oral, Q6H PRN **OR** acetaminophen (TYLENOL) suppository 650 mg, 650 mg, Rectal, Q6H PRN  polyethylene glycol (GLYCOLAX) packet 17 g, 17 g, Oral, Daily PRN  enoxaparin (LOVENOX) injection 40 mg, 40 mg, Subcutaneous, Daily  senna (SENOKOT) 8.8 MG/5ML syrup 8.8 mg, 5 mL, Oral, BID PRN  ondansetron (ZOFRAN) injection 4 mg, 4 mg, Intravenous, Q6H PRN  famotidine (PEPCID) injection 20 mg, 20 mg, Intravenous, BID  midazolam (VERSED) 1 mg/mL in D5W infusion, 1 mg/hr, Intravenous, Continuous  sodium bicarbonate 8.4 % injection 50 mEq, 50 mEq, Intravenous, Once  sodium bicarbonate 8.4 % injection 50 mEq, 50 mEq, Intravenous, Once  calcium gluconate 10 % injection 1 g, 1 g, Intravenous, Once  calcium gluconate 10 % injection 1 g, 1 g, Intravenous, Once  atropine injection 0.6 mg, 0.6 mg, Intravenous, Once  atropine injection 0.6 mg, 0.6 mg, Intravenous, Once  vasopressin 20 Units in dextrose 5 % 100 mL infusion, 0.06 Units/min, Intravenous, Continuous  sodium bicarbonate 8.4 % injection 50 mEq, 50 mEq, Intravenous, Once  fat emulsion 20 % infusion 100 mL, 100 mL, Intravenous, Once  DOPamine (INTROPIN) 400 mg in dextrose 5 % 250 mL infusion, 2.5 mcg/kg/min, Intravenous, Continuous  potassium bicarb-citric acid (EFFER-K) effervescent tablet 40 mEq, 40 mEq, Oral, Q1H  insulin regular 500 Units in sodium chloride 0.9 % 500 mL infusion, , Intravenous, Continuous  potassium chloride 20 mEq/50 mL IVPB (Central Line), 20 mEq, Intravenous, Once    Allergies:  Mangifera indica    Social History:   reports that he has never smoked. He has never used smokeless tobacco. He reports that he does not drink alcohol or use drugs. Family History: family history is not on file.      REVIEW OF SYSTEMS:    · Unable to obtain due to mental status    PHYSICAL EXAM:      BP (!) 114/49   Pulse 55 Resp 20   Ht 5' 9\" (1.753 m)   Wt (!) 328 lb 14.8 oz (149.2 kg)   SpO2 91%   BMI 48.57 kg/m²    No intake or output data in the 24 hours ending 20 1049    Constitutional and General Appearance:    Intubated, sedated  Respiratory:  · No for increased work of breathing. · On auscultation: clear to auscultation bilaterally  Cardiovascular:  · Bradycardia, heart sounds soft  · No murmurs  Abdomen:   · No masses or tenderness  · Bowel sounds present  Extremities:  ·  No Cyanosis or Clubbing  ·  Lower extremity edema: trace  Neurological:  · Alert and oriented. · Moves all extremities well    DATA:    Diagnostics:    EK2020  Active and pending    8/15/2020  Normal Sinus     Labs:     CBC:   Recent Labs     20   WBC 17.1*   HGB 13.2   HCT 39.9*        BMP:   Recent Labs     20  0644 20  0650     --   --    K 3.4*  --  1.9*   CO2 20  --   --    BUN 11  --   --    CREATININE 0.86 1.75*  --    LABGLOM >60 48*  --    GLUCOSE 127*  --   --      Pro-BNP:  No results for input(s): PROBNP in the last 72 hours. BNP: No results for input(s): BNP in the last 72 hours. PT/INR: No results for input(s): PROTIME, INR in the last 72 hours. APTT:No results for input(s): APTT in the last 72 hours. CARDIAC ENZYMES:  Recent Labs     20   CKTOTAL 100     No results for input(s): TROPONINT in the last 72 hours. FASTING LIPID PANEL:  Lab Results   Component Value Date    HDL 33 2020    TRIG 186 2020     LIVER PROFILE:  Recent Labs     20   AST 21   ALT 18   LABALBU 3.4*     Patient's Active Problem List  Active Problems:    Calcium channel blocker overdose  Resolved Problems:    * No resolved hospital problems. *    IMPRESSION:    1. Calcium channel blocker overdose: high dose, glucagon given, potasium supplementation aggressive, continuing to be bradycardic and hypotensive, cardiogenic shock  2. Suicide attempt  3.  Cardiogenic shock  4. Acute respiratory failure  5. Prader Alphonza James syndrome    RECOMMENDATIONS:  1. Extensive discussion had with the family. They do not wish to pursue aggressive measures at this time, not willing to transfer to facility for higher level of care. Agreed with current treatment plan. 2. Will obtain ECHO for baseline EF    Further recommendations after discussion with Dr. Jonathan Acosta you for allowing us to participate in the care of [address] If you have any questions or concerns, please do not hesitate to contact us. Vineet Rodriguez M.D. Resident PGY-3  [address] [place] [address]        Please note that part of this chart were generated using voice recognition  dictation software. Although every effort was made to ensure the accuracy of this automated transcription, some errors in transcription may have occurred. Attestation signed by      Attending Physician Statement:    I have discussed the care of  [address] , including pertinent history and exam findings, with the Cardiology fellow/resident. I have seen and examined the patient and the key elements of all parts of the encounter have been performed by me. I agree with the assessment, plan and orders as documented by the fellow/resident, after I modified exam findings and plan of treatments, and the final version is my approved version of the assessment. Additional Comments: The patient was seen and examined, agree with above, presented with Calcium channel block overdose, intubated and not responsive and on multiple pressors. Will obtain echo, condition discussed with family, his mother in details. Discussed transferring to [facility] or [facility] for possible ECMO or possible IABP insertion here, the mother refuses and would like only medical therapy. Will follow.

## 2020-09-22 NOTE — PROGRESS NOTES
I had a long discussion with the patient's family in person, in presence of the RN bernard and palliative nurse Chet Lindsey taking care of the patient. Patient's current clinical condition, laboratory and radiographic findings as well as recommendations of physicians consulted on the case were discussed with the patient's family in detail in simple [place]. All questions and concerns of the family were addressed, and appropriate emotional support was provided. After understanding patient's current medical condition, family decided that they wanted to continue the ongoing treatment but in case patient's heart stops (cardiac arrest)  they do not want any chest compressions, other similar  resuscitative measures to be performed on the patient. They requested that if such a condition arises, the patient should be made comfortable and nature should be allowed to take its course. They asked that patient's code status should be changed to [facility] , and signed the [facility] order form in my presence, which was witnessed by RNShalonda and palliative nurse Chet Lindsey. Will honor family's wishes, and will change patient's code status to [facility].       Aurora Lerner MD,  Internal Medicine Resident, PGY-3,   [address]  9/22/2020,10:03 AM

## 2020-09-22 NOTE — ED PROVIDER NOTES
[address] [facility]  Emergency Department  Faculty Attestation       I performed a history and physical examination of the patient and discussed management with the resident. I reviewed the residents note and agree with the documented findings including all diagnostic interpretations and plan of care. Any areas of disagreement are noted on the chart. I was personally present for the key portions of any procedures. I have documented in the chart those procedures where I was not present during the key portions. I have reviewed the emergency nurses triage note. I agree with the chief complaint, past medical history, past surgical history, allergies, medications, social and family history as documented unless otherwise noted below. Documentation of the HPI, Physical Exam and Medical Decision Making performed by scribsang is based on my personal performance of the HPI, PE and MDM. For Physician Assistant/ Nurse Practitioner cases/documentation I have personally evaluated this patient and have completed at least one if not all key elements of the E/M (history, physical exam, and MDM). Additional findings are as noted. Pertinent Comments     Primary Care Physician: Job Fournier    ED Triage Vitals [09/22/20 0205]   BP Temp Temp src Pulse Resp SpO2 Height Weight   97/84 -- -- 55 23 96 % 5' 9\" (1.753 m) (!) 328 lb 14.8 oz (149.2 kg)        History/Physical: This is a 22 y.o. male who presents to the Emergency Department with complaint of overdose. Uncertain quantities, but patient had taken tablets of verapamil or 120 mg. He also took trazodone. He did report a Imitrex, unsure of the dosing. Patient was initially speaking to EMS, but he came obtunded and was only responding to pain. Just prior to arrival, patient started have wide QRS on EKG. They state the normal rhythm strips were normal.  Patient did do this to try to harm himself.   Limited history due to the fact the patient is obtunded. Obese male who is obtunded, but was answering questions and following commands eventually. And mildly labored respirations. ,  But maintaining his airway. Heart sounds are bradycardic but regular. Patient is presented, but is answering some questions. Will squeeze hands bilaterally and wiggle toes. MDM/Plan: Overdose. Calcium channel blocker, trazodone, and Imitrex. Hemodynamically unstable with hypotension and bradycardia. EKG with wide QRS. Patient immediately got calcium chloride 1 g x 3,  2 amps of bicarb, atropine 0.6 mg x 3 doses. He was initially maintaining his airway . Given IVF bolus. Initially pH normal.  Did have elevated lactic    Patient became hypotensive, was placed on levo fed. Continue to deteriorate. Poison control was notified, and calcium channel blocker overdose guidelines were faxed to us. Had seizure-like activity with tonic-clonic jerking of his upper extremities and body. Was given Versed w/ resolution  At this time given that he was obtunded, and worsening decision was made to intubate. See procedure note by resident. Intubated using etomidate and sucks. Once patient was intubated, gastric tube placed. Given that patient is now had airway protection, and did not have a risk of aspiration, decision was made that we will go and use activated charcoal.\    Patient continued to be hypotensive and bradycardic. Epinephrine drip started. Insulin drip started at high dose. Central line placed by resident. Art line attempted. Continue to be hypotensive despite adding on the pressors and continuously having to try to treat up. Progressively worsening. Therefore vasopressin added on. Insulin was increased up to 4 units/kg/h. On D10. Glucagon 10 mg was given with minimal response. Consider doing Intralipid, however given patient's size it was to be a large amount, we did discuss with pharmacy as well as poison control.   Decision was it would hold hold on this at this moment and continue to increase the insulin drip that patient was holding steady at this time although he was still bradycardic and hypotensive. ICU admit      EKG Interpretation - initial EKG @ 2:09     Interpreted by emergency department physician    Clinical Impression: Wide QRS with bradycardia at 55. QRS is 162. Left bundle branch block compared to prior EKG, patient's EKG on 8/16/2015, was normal sinus rhythm with sinus arrhythmia. No conduction abnormalities. Chandler Rodríguez     EKG Interpretation - repeat  EKG    Interpreted by emergency department physician    Clinical Impression: Wide QRS with bradycardia at 56 with improvement of QRS mildly to [address]    EKG Interpretation - repeat  EKG @ 5:14  Interpreted by emergency department physician    Clinical Impression: Wide QRS with bradycardia at 50 w/ QRS of Män[address] 48: There was significant risk of life threatening deterioration of patient's condition requiring my direct management. Critical care time 90 minutes, excluding any documented procedures.       Chandler Rodríguez MD  Attending Emergency Physician        Chandler Rodríguez MD  09/22/20 4698

## 2020-09-22 NOTE — ED PROVIDER NOTES
[facility] ED  Emergency Department Encounter  Emergency Medicine Resident     Pt Name: Evita Eaton  MRN: [id number]  [place] 1995  Date of evaluation: 9/22/20  PCP:  Maria Antonia Ruiz    CHIEF COMPLAINT       Medication overdose    HISTORY OFPRESENT ILLNESS  (Location/Symptom, Timing/Onset, Context/Setting, Quality, Duration, Modifying Factors,Severity.)      Evita Eaton is a 23 yo male who presents with medication overdose. Per EMS, patient tried to attempt suicide by taking all of his verapamil and trazodone. There were bottles found and it appears that the patient took about 90 tablets of 120 mg verapamil, unknown amount of trazodone. Patient also admitting to me Imitrex ingestion. Unable to get more history due to patient's mental status. PAST MEDICAL / SURGICAL / SOCIAL / FAMILY HISTORY      has a past medical history of Depression, Hypertension, Prader-Willi syndrome, and Seizures ([facility] [address] has a past surgical history that includes fracture surgery and Adenoidectomy.      Social History     Socioeconomic History    Marital status: Single     Spouse name: Not on file    Number of children: Not on file    Years of education: Not on file    Highest education level: Not on file   Occupational History    Not on file   Social Needs    Financial resource strain: Not on file    Food insecurity     Worry: Not on file     Inability: Not on file    Transportation needs     Medical: Not on file     Non-medical: Not on file   Tobacco Use    Smoking status: Never Smoker    Smokeless tobacco: Never Used    Tobacco comment: Patient is a non-smoker   Substance and Sexual Activity    Alcohol use: No    Drug use: No    Sexual activity: Not on file   Lifestyle    Physical activity     Days per week: Not on file     Minutes per session: Not on file    Stress: Not on file   Relationships    Social connections     Talks on phone: Not on file     Gets together: Not on file Attends Gnosticist service: Not on file     Active member of club or organization: Not on file     Attends meetings of clubs or organizations: Not on file     Relationship status: Not on file    Intimate partner violence     Fear of current or ex partner: Not on file     Emotionally abused: Not on file     Physically abused: Not on file     Forced sexual activity: Not on file   Other Topics Concern    Not on file   Social History Narrative    Not on file       No family history on file. Allergies:  Mangifera indica    Home Medications:  Prior to Admission medications    Medication Sig Start Date End Date Taking?  Authorizing Provider   ARIPiprazole (ABILIFY) 10 MG tablet Take 10 mg by mouth daily 7/21/20 8/20/20  Historical Provider, MD   traZODone (DESYREL) 50 MG tablet Take 1 tablet by mouth nightly as needed for Sleep 7/9/20   RON Henson CNP   cloNIDine (CATAPRES) 0.1 MG tablet Take 1 tablet by mouth nightly 7/9/20   RON Henson CNP   hydrOXYzine (ATARAX) 25 MG tablet Take 25 mg by mouth 3 times daily as needed for Itching    Historical Provider, MD   FLUoxetine (PROZAC) 40 MG capsule Take 40 mg by mouth daily    Historical Provider, MD   acetaminophen-codeine (TYLENOL/CODEINE #3) 300-30 MG per tablet Take 1 tablet by mouth 3 times daily as needed for Pain 10/20/15   Heriberto Alaniz,    VERAPAMIL HCL PO Take 120 mg by mouth daily     Historical Provider, MD   zonisamide (ZONEGRAN) 100 MG capsule Take 200 mg by mouth 2 times daily    Historical Provider, MD   SUMAtriptan (IMITREX) 100 MG tablet Take 100 mg by mouth once as needed for Migraine    Historical Provider, MD       REVIEW OFSYSTEMS    (2-9 systems for level 4, 10 or more for level 5)      Review of Systems   Unable to perform ROS: Acuity of condition       PHYSICAL EXAM   (up to 7 for level 4, 8 or more forlevel 5)      INITIAL VITALS:   Vitals:    09/22/20 0205 09/22/20 0234 09/22/20 0310   BP: 97/84 (!) 114/49    Pulse: blood glucose less than 70 mg/dL and patient NOT ALERT or NPO    Inpatient consult to Critical Care    Inpatient consult to Cardiology    Mechanical ventilation    Pulse oximetry, continuous    End Tidal CO2 Continuous    Initiate Oxygen Therapy Protocol    ABG draw    POCT Glucose    Venous Blood Gas, POC    Creatinine W/GFR Point of Care    Lactic Acid, POC    POCT Glucose    Anion Gap (Calc) POC    POC Blood Gas    POC Blood Gas    POCT Glucose    POC Glucose Fingerstick    POC Blood Gas and Chemistry    POC Glucose Fingerstick    POC Glucose Fingerstick    POC Glucose Fingerstick    Arterial Blood Gas, POC    Creatinine W/GFR Point of Care    Lactic Acid, POC    POCT Glucose    Anion Gap (Calc) POC    EKG 12 Lead    EKG 12 Lead    EKG 12 Lead    PATIENT STATUS (FROM ED OR OR/PROCEDURAL) Inpatient       MEDICATIONS ORDERED:  Orders Placed This Encounter   Medications    sodium bicarbonate 8.4 % injection     Juan Ramon Nieves: cabinet override    atropine 1 MG/10ML injection     Enmanuel Ates: cabinet override    0.9 % sodium chloride bolus    norepinephrine (LEVOPHED) 16 mg in sodium chloride 0.9 % 250 mL infusion    atropine injection 0.6 mg    glucose (GLUTOSE) 40 % oral gel 15 g    dextrose 50 % IV solution    glucagon (rDNA) injection 1 mg    dextrose 5 % solution    insulin regular (HUMULIN R;NOVOLIN R) 100 Units in sodium chloride 0.9 % 100 mL infusion    dextrose 10 % 1,000 mL infusion    calcium chloride 10 % injection     Enmanuel Ates: cabinet override    ondansetron (ZOFRAN) 4 MG/2ML injection     Enmanuel Ates: cabinet override    atropine 1 MG/10ML injection     Isidro Matta: cabinet override    EPINEPHrine (EPINEPHrine HCL) 5 mg in dextrose 5 % 250 mL infusion    dextrose 5 % solution     Isidro Matta: cabinet override    EPINEPHrine 1 MG/ML injection     Isidro Matta: cabinet override    midazolam (VERSED) 2 MG/2ML injection Logan Patient L: cabinet override    MIDAZOLAM 1 MG/ML IN D5W INFUSION     Lexy Anthony: cabinet override    charcoal activated liquid 50 g    midazolam (VERSED) injection 4 mg    midazolam (VERSED) 1 mg/mL in D5W infusion    sodium bicarbonate 8.4 % injection 50 mEq    sodium bicarbonate 8.4 % injection 50 mEq    calcium gluconate 10 % injection 1 g    calcium gluconate 10 % injection 1 g    atropine injection 0.6 mg    atropine injection 0.6 mg    glucagon (rDNA) injection 10 mg    vasopressin 20 Units in dextrose 5 % 100 mL infusion    sodium bicarbonate 8.4 % injection 50 mEq    DISCONTD: fat emulsion 20 % infusion 223.8 mL    DISCONTD: fat emulsion 20% fish oil/plant based syringe    fat emulsion 20 % infusion 100 mL    DOPamine (INTROPIN) 400 mg in dextrose 5 % 250 mL infusion    potassium chloride 20 mEq/50 mL IVPB (Central Line)       Initial MDM/Plan/ED course: 22 y.o. male who presents with a calcium channel blocker overdose. Per EMS patient took about ninety 120 mg extended release verapamil as well as an unknown amount of trazodone and Imitrex. On presentation patient has a GCS of 11 as he opens his eyes to voice, is following commands, and was mumbling incoherent speech. At that time patient has systolic blood pressures in the 90s and heart rate in the 50s. Patient had bilateral peripheral IV access and 2 L of IV fluids was started and patient was given initially 1 g of calcium and 2 A of bicarb as the patient's QRS is widened on EKG. Patient ended up receiving a total of 3 g of calcium, 2 A of bicarb, and 3 separate doses of 0.6 mg atropine due to bradycardia. Patient continued to be hypotensive systolics dipping into the 70s and therefore was also placed on levophed. Patient's pressures and heart rate continue to be low and therefore was started on epinephrine drip.   Patient then continued to decompensate and started shaking however he was responsive these may be partial Total Bilirubin 0.25 (*)     Total Protein 6.1 (*)     Alb 3.4 (*)     All other components within normal limits   HGB/HCT - Abnormal; Notable for the following components:    POC Hemoglobin 12.6 (*)     POC Hematocrit 37 (*)     All other components within normal limits   TOX SCR, BLD, ED - Abnormal; Notable for the following components:    Acetaminophen Level <5 (*)     Salicylate Lvl <1 (*)     All other components within normal limits   CALCIUM, IONIZED - Abnormal; Notable for the following components:    Calcium, Ion 1.46 (*)     All other components within normal limits   LACTIC ACID, WHOLE BLOOD - Abnormal; Notable for the following components:    Lactic Acid, Whole Blood 8.5 (*)     All other components within normal limits   URINALYSIS WITH MICROSCOPIC - Abnormal; Notable for the following components:    Protein, UA TRACE (*)     All other components within normal limits   HGB/HCT - Abnormal; Notable for the following components:    POC Hemoglobin 13.1 (*)     POC Hematocrit 39 (*)     All other components within normal limits   POTASSIUM (POC) - Abnormal; Notable for the following components:    POC Potassium 1.7 (*)     All other components within normal limits   CALCIUM, IONIC (POC) - Abnormal; Notable for the following components:    POC Ionized Calcium 1.40 (*)     All other components within normal limits   VENOUS BLOOD GAS, POINT OF CARE - Abnormal; Notable for the following components:    pO2, Siva 55.5 (*)     O2 Sat, Siva 86 (*)     All other components within normal limits   LACTIC ACID,POINT OF CARE - Abnormal; Notable for the following components:    POC Lactic Acid 4.29 (*)     All other components within normal limits   POCT GLUCOSE - Abnormal; Notable for the following components:    POC Glucose 131 (*)     All other components within normal limits   POC GLUCOSE FINGERSTICK - Abnormal; Notable for the following components:    POC Glucose 273 (*)     All other components within normal limits   POC GLUCOSE FINGERSTICK - Abnormal; Notable for the following components:    POC Glucose 317 (*)     All other components within normal limits   POC GLUCOSE FINGERSTICK - Abnormal; Notable for the following components:    POC Glucose 363 (*)     All other components within normal limits   POC GLUCOSE FINGERSTICK - Abnormal; Notable for the following components:    POC Glucose 386 (*)     All other components within normal limits   ARTERIAL BLOOD GAS, POC - Abnormal; Notable for the following components:    POC pH 7.292 (*)     POC pCO2 34.2 (*)     POC PO2 75.6 (*)     POC HCO3 16.5 (*)     TCO2 (calc), Art 18 (*)     Negative Base Excess, Art 9 (*)     All other components within normal limits   CREATININE W/GFR POINT OF CARE - Abnormal; Notable for the following components:    POC Creatinine 1.75 (*)     GFR Comment 58 (*)     GFR Non- 48 (*)     All other components within normal limits   LACTIC ACID,POINT OF CARE - Abnormal; Notable for the following components:    POC Lactic Acid 8.75 (*)     All other components within normal limits   POCT GLUCOSE - Abnormal; Notable for the following components:    POC Glucose 485 (*)     All other components within normal limits   ANION GAP (CALC) POC - Abnormal; Notable for the following components:    Anion Gap 19 (*)     All other components within normal limits   MAGNESIUM   CK   MYOGLOBIN, SERUM   SODIUM (POC)   POTASSIUM (POC)   CHLORIDE (POC)   CALCIUM, IONIC (POC)   COVID-19   URINE DRUG SCREEN   SODIUM (POC)   CHLORIDE (POC)   POTASSIUM   CREATININE W/GFR POINT OF CARE   ANION GAP (CALC) POC   POC BLOOD GAS   POC BLOOD GAS   POCT GLUCOSE   POCT GLUCOSE   POCT GLUCOSE   POCT GLUCOSE   POCT GLUCOSE   POCT GLUCOSE   POCT GLUCOSE   POCT GLUCOSE   POCT GLUCOSE   POCT GLUCOSE   POCT GLUCOSE   POCT GLUCOSE   POCT GLUCOSE   POCT GLUCOSE   POCT GLUCOSE   POCT GLUCOSE   POCT GLUCOSE   POCT GLUCOSE   POCT GLUCOSE   POCT GLUCOSE   POCT GLUCOSE   POCT GLUCOSE   POCT GLUCOSE   POCT GLUCOSE   POCT GLUCOSE   POCT GLUCOSE   POC BLOOD GAS AND CHEMISTRY   POCT GLUCOSE   POCT GLUCOSE   POCT GLUCOSE   POCT GLUCOSE         RADIOLOGY:  Xr Chest Portable    Result Date: 9/22/2020  EXAMINATION: ONE XRAY VIEW OF THE CHEST 9/22/2020 3:52 am COMPARISON: Less than an hour prior HISTORY: ORDERING SYSTEM PROVIDED HISTORY: central line insertion TECHNOLOGIST PROVIDED HISTORY: central line insertion Reason for Exam: line placement   supine port FINDINGS: ETT as seen previously. Right IJ CVC in place, tip in SVC. Enteric tube in place unchanged. Lung volumes are low. Interstitial opacities again noted. Heart size within normal limits. No pneumothorax. No sizable pleural effusion. Right IJ CVC in place without apparent complication. Endotracheal tube should be advanced about 2-3 cm. Xr Chest Portable    Result Date: 9/22/2020  EXAMINATION: ONE XRAY VIEW OF THE CHEST 9/22/2020 3:18 am COMPARISON: 08/15/2015 HISTORY: ORDERING SYSTEM PROVIDED HISTORY: post intubation TECHNOLOGIST PROVIDED HISTORY: post intubation Reason for Exam: sob Acuity: Acute Type of Exam: Initial FINDINGS: Please see the impression     Study limited by the patient's body habitus. ET tube terminating just above the clavicles. The tube may be advanced 2 cm for optimal positioning. Enteric tube in the gastric fundus. Low lung volumes which accentuate the cardiomediastinal silhouette. Multiple patchy opacities in the bilateral lungs, due to atelectasis, aspiration, or multifocal pneumonia. No sizable pleural effusion. No pneumothorax       EKG    Initial EKG revealing sinus bradycardia with widened QRS as well as left bundle branch block not meeting Sgarbossa criteria    All EKG's are interpreted by the [address] [place] [address] Drive Physician who either signs or Co-signs this chart in the absence of a cardiologist.      PROCEDURES:  Central Line Placement Procedure Note    Performed by:  Tenzin Dorantes     Indication: vascular access, hypovolemia and centrally administered medications    Consent: Unable to be obtained due to the emergent nature of this procedure. Time out performed: Immediately prior to the procedure a \"time out\" was called to verify the correct patient, the correct procedure, equipment, support staff and site/side marked as required. All elements of maximal sterile barrier techniques were followed. Procedure: The patient was positioned appropriately and the skin over the right internal jugular vein was prepped with betadine and draped in a sterile fashion. Local anesthesia was obtained by infiltration using 1% Lidocaine without epinephrine. A large bore needle was used to identify the vein. A guide wire was then inserted into the vein through the needle. A triple lumen catheter was then inserted into the vessel over the guide wire using the Seldinger technique. All ports showed good, free flowing blood return and were flushed with saline solution. The catheter was then securely fastened to the skin with sutures and covered with a sterile dressing. A post procedure X-ray was ordered and showed good line position. The patient tolerated the procedure well. Complications: None      Intubation Procedure Note    Performed by: Angela Gomez DO    Indication: Respiratory failure and airway compromise    Consent: Unable to be obtained due to the emergent nature of this procedure. Time out performed: Immediately prior to the procedure a \"time out\" was called to verify the correct patient, the correct procedure, equipment, support staff and site/side marked as required. Medications Used: etomidate intravenously, succinycholine intravenously and midazolam 2.0 mg intravenously    Procedure: The patient was placed in the appropriate position. Intubation was performed by direct laryngoscopy using a laryngoscope and an 8.0 cuffed endotracheal tube.   The cuff was then inflated and the tube was secured appropriately at a distance of 23 cm to the dental ridge. Initial confirmation of placement included bilateral breath sounds, an end tidal CO2 detector, tube fogging, adequate pulse oximetry reading and improved skin color. A chest x-ray to verify correct placement of the tube showed the tube needed advancing and the tube was repositioned accordingly. The patient tolerated the procedure well. Complications: None      CONSULTS:  IP CONSULT TO CRITICAL CARE  IP CONSULT TO CARDIOLOGY    CRITICAL CARE:  Please see attending note    FINAL IMPRESSION      1.  Calcium channel blocker overdose, intentional self-harm, initial encounter [facility])          [address] / [facility] [address] Admitted 09/22/2020 03:55:03 AM      PATIENT REFERRED TO:  Horace Negrete  [address] [place] [address] [id number]            DISCHARGE MEDICATIONS:  New Prescriptions    No medications on file       Karena Cummings DO  Emergency Medicine Resident    (Please note that portions of this note were completed with a voice recognition program.Efforts were made to edit the dictations but occasionally words are mis-transcribed.)        Tammy Quevedo DO  Resident  09/22/20 9988 [facility] DO Isatu  Resident  09/22/20 1814

## 2020-09-22 NOTE — PROGRESS NOTES
[facility]  Documentation Note                Admission Dx: Calcium channel blocker overdose, intentional self-harm, initial encounter ([facility] [address] Bernardo Habermann    Past Medical History:   has a past medical history of Depression, Hypertension, Prader-Willi syndrome, and Seizures ([facility] [address] Previous Testing:   None     Previous office/hospital visit:   Dr. Anny Huizar 8/16/15:    The patient was seen and examined, agree with above, presented with chest pain when after throwing fists, pain is reproducible, constant since yesterday but better with tyelenol, ECG and APRYL are normal, chest pain is of noncardiac origin    [facility]

## 2020-09-22 NOTE — ED NOTES
Pharmacy tubing IV potassium, writer spoke with Al Delarosa, pharmacist.        Gael South, RN  09/22/20 0341

## 2020-09-22 NOTE — ED NOTES
PT to ED via EMS for attempted suicide by ingestion  Pt told EMS that he took over 90 tablets of verapamil and trazodone   Verapamil was 120 mg and the dosage of trazodone is unknown  Pt arrived to ED responsive to name and situation  Pt confirmed that he was trying to kill himself, \"Just because\"  Dr. Pascual Umana and Dr. Gutierrez at bedside, Pt placed on cardiac monitor, IV access obtained PTA     Dada Ferris RN  09/22/20 6933

## 2020-09-22 NOTE — PROGRESS NOTES
.. PALLIATIVE CARE NURSING ASSESSMENT    Patient: Yosvany Pedroza  Room: 0116/0116-01    Reason For Consult   Goals of care evaluation  Distress management  Guidance and support  Facilitate communications  Assistance in coordinating care      Impression: Yosvany Pedroza is a 22y.o. year old male  has a past medical history of Depression, Hypertension, Prader-Willi syndrome, and Seizures ([facility] [address] .  Currently hospitalized for the management of calcium channel blocker overdose. The Palliative Care Team is following to assist with goals of care/family support. Vital Signs  Blood pressure (!) 114/49, pulse 55, temperature 97.7 °F (36.5 °C), resp. rate 20, height 5' 9\" (1.753 m), weight (!) 328 lb 14.8 oz (149.2 kg), SpO2 91 %. Patient Active Problem List   Diagnosis    Chest pain    Prader-Willi syndrome    Schizoaffective disorder ([facility] [address]    Calcium channel blocker overdose       Palliative Interaction: Pt was intentional overdose at home. Pt now on 4 pressors at maximum rates. He is intubated on vent. He has history of depression, Prader-Willi syndrome, epilepsy, HTN.     GOALS OF CARE/FAMILY SUPPORT: Pt's mother Yeimi Sanchez and patient's sister Clemente Galeazzi at bedside. They are tearful and update me on events leading up to admission. Mother states, \"He has had problems his whole life, he has MR\". Mother states patient has not attempted this in the past but has suffered depression. She states patient is now \"mentally at a high school level and the girl he liked broke up with him and that's why he did this\". Emotional support offered to family. CODE STATUS: Code status discussed with mother who states, \"I don't want him to have CPR or cause him any more pain\". Reviewed the 3 code classifications with family and mother and sister agree on [facility]. IM resident came and confirmed family's wishes and order was written. Family would still like to continue current interventions.        Goals/Plan of care  Education/support

## 2020-09-23 LAB
GLUCOSE BLD-MCNC: 585 MG/DL (ref 75–110)
GLUCOSE BLD-MCNC: >600 MG/DL (ref 75–110)

## 2020-09-23 NOTE — PROGRESS NOTES
RN's at bed with family present. Asked mother if she wanted us to titrate the versed gtt to make pt appear more comfortable, mother declined offer. All questions answered and concerns addressed at this time.

## 2020-09-23 NOTE — FLOWSHEET NOTE
Assessment:  Patient is a 22 y.o. male in room 80. Patient is in the hospital due to depression and self harm.  engaged patient's mother Caitlin Alvarado [phone]. Family making decision whether or not to transfer patient to [address] [place] [address]. Mother very tearful, and anxious. Intervention:   was ministry of presence.  offered prayer and family eagerly agreed. Outcome:  Family expressed gratitude for visit and support. Plan:  Chaplains will remain available for spiritual and emotional support as needed. 09/22/20 1006   Encounter Summary   Services provided to: Patient; Family   Referral/Consult From: Nurse;Multi-disciplinary team   Support System Parent; Family members   Continue Visiting   (9/22/2020)   Complexity of Encounter High   Length of Encounter 30 minutes   Spiritual Assessment Completed Yes   Routine   Type Follow up   Assessment Tearful;Grieving; Anxious   Intervention Active listening;Prayer;Sustaining presence/ Ministry of presence   Outcome Expressed gratitude
RN's at bedside and family stated concerns for adding more medications. RN's updated mother and patients aunt about plan of care and what the medications we were using were for. Pt's mother expressed some anger and concerns about how she felt that no one was informing her of his prognosis. RN explained that it may be too early to tell given the life of the medication and the current medications the patient is on. RN spoke with the crit care resident who then spoke with the patients family at bedside. Family then got upset again and RN's allowed time for family to express concerns and share stories about the patients/families life. Dr. Lazara Maxwell shared with RN Theador Arbour that the patient has a likelihood of a poor prognosis and meaningful recovery to his previous baseline. RN told patients mother she would allow her time to be alone with the patient and would maintain current gtts but not start anything else without her approval. Mother thanked staffed. Calcium, Potassium replacements all held at this time. RN advised lab to hold off on blood cultures until notified if necessary. Crit Care notified of family's decisions at this time as well.
Support System Family members   Continue Visiting   (9/22/20)   Complexity of Encounter High   Length of Encounter 30 minutes   Spiritual Assessment Completed Yes   Crisis   Type Emotional distress   Assessment Tearful   Intervention Explored feelings, thoughts, concerns   Outcome Comfort   Advance Directives (For Healthcare)   Healthcare Directive No, patient does not have an advance directive for healthcare treatment   Values / Beliefs   Do you have any ethnic, cultural, sacramental, or spiritual Synagogue needs you would like us to be aware of while you are in the hospital? Yes  ([place])

## 2020-09-23 NOTE — PROGRESS NOTES
RN's at bedside, family also at bedside. Mother approved of lab draw. RN's will notify family when resulted. Family unaware when pt's aunt will be present at bedside. All questions answered and concerns addressed.

## 2020-09-26 NOTE — DISCHARGE SUMMARY
ICU DEATH NOTE    PATIENT NAME: Neville Ormond  YOB: 1995  MEDICAL RECORD NO. [id number]  DATE: 9/26/2020  PRIMARY CARE PHYSICIAN: Panfilo Delgado     ADMIT TIME: 9/22/2020  1:57 AM     DIAGNOSIS OF DEATH     I have confirmed the death of this patient in accordance with accepted medical standards. The patient is dead as evidenced by cardiac death or cessation of brain function:    Cardiac Death (check all that apply):     [x]  Absence of respiratory effort by observation     [x]  Absence of pulse by palpation     [x]  Absence of blood pressure by sphygmomanometry     [x]  Absence of sustainable cardiac rhythm by monitor    Death by Cessation of Brain Function (check all that apply):     []  Absence of Cerebral Function with no motor response     []  Absence of brain stem function by systemic physical exam     []  Failure to respond with respiratory drive by apnea test     []  Cerebral Electrical silence as interpreted by qualified reader        OR     []  Absence of brain blood flow by radiologic technique      CERTIFICATION OF DEATH     The patient was pronounced dead on:     Date: 9/22/2020 at 21:18    NOTIFICATIONS     Attending physician that will sign Death Certificate:   Dr Hamm Martin notified:  Mother, daughter and aunt at bedside                                                        notified (Name):                                                         [x]  Per Nursing      Ranjit Fontana MD  Emergency medicine resident PGY-2  [address] [place] [address])   9/26/2020, 6:27 PM